# Patient Record
Sex: FEMALE | Race: WHITE | Employment: FULL TIME
[De-identification: names, ages, dates, MRNs, and addresses within clinical notes are randomized per-mention and may not be internally consistent; named-entity substitution may affect disease eponyms.]

---

## 2017-11-12 ENCOUNTER — HEALTH MAINTENANCE LETTER (OUTPATIENT)
Age: 22
End: 2017-11-12

## 2023-02-28 ENCOUNTER — VIRTUAL VISIT (OUTPATIENT)
Dept: FAMILY MEDICINE | Facility: CLINIC | Age: 28
End: 2023-02-28

## 2023-02-28 DIAGNOSIS — Z34.00 SUPERVISION OF NORMAL FIRST PREGNANCY: Primary | ICD-10-CM

## 2023-02-28 PROCEDURE — 99207 PR NO CHARGE NURSE ONLY: CPT

## 2023-02-28 RX ORDER — PRENATAL VIT/IRON FUM/FOLIC AC 27MG-0.8MG
1 TABLET ORAL DAILY
COMMUNITY

## 2023-03-19 LAB
ABO/RH(D): NORMAL
ANTIBODY SCREEN: NEGATIVE
SPECIMEN EXPIRATION DATE: NORMAL

## 2023-03-20 ENCOUNTER — LAB (OUTPATIENT)
Dept: LAB | Facility: CLINIC | Age: 28
End: 2023-03-20
Payer: COMMERCIAL

## 2023-03-20 ENCOUNTER — HOSPITAL ENCOUNTER (OUTPATIENT)
Dept: ULTRASOUND IMAGING | Facility: CLINIC | Age: 28
Discharge: HOME | End: 2023-03-20
Attending: NURSE PRACTITIONER | Admitting: NURSE PRACTITIONER
Payer: COMMERCIAL

## 2023-03-20 DIAGNOSIS — Z34.00 SUPERVISION OF NORMAL FIRST PREGNANCY: ICD-10-CM

## 2023-03-20 LAB
ERYTHROCYTE [DISTWIDTH] IN BLOOD BY AUTOMATED COUNT: 12.9 % (ref 10–15)
HCT VFR BLD AUTO: 41 % (ref 35–47)
HGB BLD-MCNC: 13.7 G/DL (ref 11.7–15.7)
MCH RBC QN AUTO: 31.9 PG (ref 26.5–33)
MCHC RBC AUTO-ENTMCNC: 33.4 G/DL (ref 31.5–36.5)
MCV RBC AUTO: 95 FL (ref 78–100)
PLATELET # BLD AUTO: 203 10E3/UL (ref 150–450)
RBC # BLD AUTO: 4.3 10E6/UL (ref 3.8–5.2)
WBC # BLD AUTO: 7.5 10E3/UL (ref 4–11)

## 2023-03-20 PROCEDURE — 85027 COMPLETE CBC AUTOMATED: CPT

## 2023-03-20 PROCEDURE — 36415 COLL VENOUS BLD VENIPUNCTURE: CPT

## 2023-03-20 PROCEDURE — 76801 OB US < 14 WKS SINGLE FETUS: CPT

## 2023-03-20 PROCEDURE — 87340 HEPATITIS B SURFACE AG IA: CPT

## 2023-03-20 PROCEDURE — 87389 HIV-1 AG W/HIV-1&-2 AB AG IA: CPT

## 2023-03-20 PROCEDURE — 86803 HEPATITIS C AB TEST: CPT

## 2023-03-20 PROCEDURE — 86901 BLOOD TYPING SEROLOGIC RH(D): CPT

## 2023-03-20 PROCEDURE — 86762 RUBELLA ANTIBODY: CPT

## 2023-03-20 PROCEDURE — 86780 TREPONEMA PALLIDUM: CPT

## 2023-03-20 PROCEDURE — 86900 BLOOD TYPING SEROLOGIC ABO: CPT

## 2023-03-20 PROCEDURE — 87086 URINE CULTURE/COLONY COUNT: CPT

## 2023-03-20 PROCEDURE — 86850 RBC ANTIBODY SCREEN: CPT

## 2023-03-21 LAB
HBV SURFACE AG SERPL QL IA: NONREACTIVE
HCV AB SERPL QL IA: NONREACTIVE
HIV 1+2 AB+HIV1 P24 AG SERPL QL IA: NONREACTIVE
RUBV IGG SERPL QL IA: 5.46 INDEX
RUBV IGG SERPL QL IA: POSITIVE
T PALLIDUM AB SER QL: NONREACTIVE

## 2023-03-22 LAB — BACTERIA UR CULT: NO GROWTH

## 2023-04-10 ENCOUNTER — PRENATAL OFFICE VISIT (OUTPATIENT)
Dept: FAMILY MEDICINE | Facility: CLINIC | Age: 28
End: 2023-04-10
Payer: COMMERCIAL

## 2023-04-10 ENCOUNTER — MYC MEDICAL ADVICE (OUTPATIENT)
Dept: FAMILY MEDICINE | Facility: CLINIC | Age: 28
End: 2023-04-10

## 2023-04-10 VITALS
HEART RATE: 91 BPM | HEIGHT: 64 IN | DIASTOLIC BLOOD PRESSURE: 60 MMHG | OXYGEN SATURATION: 99 % | TEMPERATURE: 98.1 F | BODY MASS INDEX: 33.29 KG/M2 | SYSTOLIC BLOOD PRESSURE: 120 MMHG | WEIGHT: 195 LBS | RESPIRATION RATE: 18 BRPM

## 2023-04-10 DIAGNOSIS — Z34.02 ENCOUNTER FOR PRENATAL CARE OF FIRST PREGNANCY, SECOND TRIMESTER: Primary | ICD-10-CM

## 2023-04-10 PROCEDURE — 87491 CHLMYD TRACH DNA AMP PROBE: CPT | Performed by: NURSE PRACTITIONER

## 2023-04-10 PROCEDURE — G0145 SCR C/V CYTO,THINLAYER,RESCR: HCPCS | Performed by: NURSE PRACTITIONER

## 2023-04-10 PROCEDURE — 87591 N.GONORRHOEAE DNA AMP PROB: CPT | Performed by: NURSE PRACTITIONER

## 2023-04-10 PROCEDURE — 99203 OFFICE O/P NEW LOW 30 MIN: CPT | Performed by: NURSE PRACTITIONER

## 2023-04-10 ASSESSMENT — PAIN SCALES - GENERAL: PAINLEVEL: NO PAIN (0)

## 2023-04-10 ASSESSMENT — PATIENT HEALTH QUESTIONNAIRE - PHQ9
10. IF YOU CHECKED OFF ANY PROBLEMS, HOW DIFFICULT HAVE THESE PROBLEMS MADE IT FOR YOU TO DO YOUR WORK, TAKE CARE OF THINGS AT HOME, OR GET ALONG WITH OTHER PEOPLE: NOT DIFFICULT AT ALL
SUM OF ALL RESPONSES TO PHQ QUESTIONS 1-9: 8
SUM OF ALL RESPONSES TO PHQ QUESTIONS 1-9: 8

## 2023-04-10 NOTE — PROGRESS NOTES
Pregnancy risks              1. Pregravid BMI 31- discussed recommended weight gain- monitor    Prenatal care plan              - OB labs:  Blood type O pos,  immune  Rubella, all others are normal              - First trimester screening:  Discussed declined              - Second trimester screening:  Discussed declined              - Pain management:  Will discuss at the future visit              - Ped: will discuss at future visit              - Circumcision if boy: Will discuss at the future visit              - BC: Will discuss at the future visit              - Breast feeding:  Will discuss in the future visits              - Covid 19 vaccine: declined              - flu: declined                 SUBJECTIVE:     HPI:    This is a 27 year old female patient,  who presents for her first obstetrical visit.    JAMI: 10/21/2023, by Ultrasound.  She is 12w2d weeks.  Her cycles are irregular.  Her last menstrual period was normal.   Since her LMP, she has experienced  nausea, emesis and fatigue).   She denies abdominal pain, vaginal discharge and vaginal bleeding.    Additional History:         Have you travelled during the pregnancy?No  Have your sexual partner(s) travelled during the pregnancy?No      HISTORY:   Planned Pregnancy: No  Marital Status: Single  Occupation: Nail tech  Living in Household: significant other    Past History:  Her past medical history   Past Medical History:   Diagnosis Date     NO ACTIVE PROBLEMS     Td due    .      She has a history of  no medical history changes    Since her last LMP she denies use of alcohol, tobacco and street drugs.    Past medical, surgical, social and family history were reviewed and updated in [facility].        Current Outpatient Medications   Medication     Prenatal Vit-Fe Fumarate-FA (PRENATAL MULTIVITAMIN W/IRON) 27-0.8 MG tablet     cetirizine (ZYRTEC) 10 MG tablet     Norgestim-Eth Estrad Triphasic (TRI-SPRINTEC) 0.18/0.215/0.25 MG-35 MCG TABS     No  "current facility-administered medications for this visit.       ROS:   12 point review of systems negative other than symptoms noted below or in the HPI.      OBJECTIVE:     EXAM:  /60   Pulse 91   Temp 98.1  F (36.7  C) (Temporal)   Resp 18   Ht 1.626 m (5' 4\")   Wt 88.5 kg (195 lb)   LMP 12/23/2022 (Approximate)   SpO2 99%   BMI 33.47 kg/m   Body mass index is 33.47 kg/m .    GENERAL: healthy, alert and no distress  EYES: Eyes grossly normal to inspection, PERRL and conjunctivae and sclerae normal  HENT: ear canals and TM's normal, nose and mouth without ulcers or lesions  NECK: no adenopathy, no asymmetry, masses, or scars and thyroid normal to palpation  RESP: lungs clear to auscultation - no rales, rhonchi or wheezes  BREAST: normal without masses, tenderness or nipple discharge and no palpable axillary masses or adenopathy  CV: regular rate and rhythm, normal S1 S2, no S3 or S4, no murmur, click or rub, no peripheral edema and peripheral pulses strong  ABDOMEN: soft, nontender, no hepatosplenomegaly, no masses and bowel sounds normal   (female): normal female external genitalia, normal urethral meatus, vaginal mucosa pink, moist, well rugated, and normal cervix/adnexa/uterus without masses or discharge  MS: no gross musculoskeletal defects noted, no edema  SKIN: no suspicious lesions or rashes  NEURO: Normal strength and tone, mentation intact and speech normal  PSYCH: mentation appears normal, affect normal/bright    ASSESSMENT/PLAN:       ICD-10-CM    1. Encounter for prenatal care of first pregnancy, second trimester  Z34.02 Pap screen reflex to HPV if ASCUS - recommend age 25 - 29     NEISSERIA GONORRHOEA PCR     CHLAMYDIA TRACHOMATIS PCR     US OB > 14 Weeks                                     Routine Prenatal Care:  -Discussed genetic screening options, including sequential and quad testing/AFP, cell-free DNA as well as diagnostic testing including amniocentesis. Patient would like to " think about further, will discuss next visit  -Discussed Cystic Fibrosis and SMA carrier screening, patient would like to think about further, will discuss next visit  -Discussed ordered labs and ultrasound,   all questions answered.      -Folder given, outlining exercise, weight gain, schedule of visits, routine and indicated ultrasounds, prenatal vitamins and childbirth education.  Desires delivery at [facility]          Return in 4 weeks for routine OB care                   Caroline Grijalva, NP

## 2023-04-11 LAB
C TRACH DNA SPEC QL NAA+PROBE: NEGATIVE
N GONORRHOEA DNA SPEC QL NAA+PROBE: NEGATIVE

## 2023-04-13 ENCOUNTER — PATIENT OUTREACH (OUTPATIENT)
Dept: FAMILY MEDICINE | Facility: CLINIC | Age: 28
End: 2023-04-13
Payer: COMMERCIAL

## 2023-04-13 DIAGNOSIS — R87.610 ASCUS OF CERVIX WITH NEGATIVE HIGH RISK HPV: ICD-10-CM

## 2023-04-13 LAB
BKR LAB AP GYN ADEQUACY: NORMAL
BKR LAB AP GYN INTERPRETATION: NORMAL
BKR LAB AP HPV REFLEX: NORMAL
BKR LAB AP PREVIOUS ABNORMAL: NORMAL
PATH REPORT.COMMENTS IMP SPEC: NORMAL
PATH REPORT.COMMENTS IMP SPEC: NORMAL
PATH REPORT.RELEVANT HX SPEC: NORMAL

## 2023-04-29 ENCOUNTER — HEALTH MAINTENANCE LETTER (OUTPATIENT)
Age: 28
End: 2023-04-29

## 2023-05-08 ENCOUNTER — PRENATAL OFFICE VISIT (OUTPATIENT)
Dept: FAMILY MEDICINE | Facility: CLINIC | Age: 28
End: 2023-05-08
Payer: COMMERCIAL

## 2023-05-08 VITALS
HEART RATE: 86 BPM | SYSTOLIC BLOOD PRESSURE: 110 MMHG | WEIGHT: 204 LBS | RESPIRATION RATE: 16 BRPM | DIASTOLIC BLOOD PRESSURE: 64 MMHG | BODY MASS INDEX: 35.02 KG/M2 | TEMPERATURE: 98.6 F | OXYGEN SATURATION: 100 %

## 2023-05-08 DIAGNOSIS — Z34.02 ENCOUNTER FOR SUPERVISION OF LOW-RISK FIRST PREGNANCY IN SECOND TRIMESTER: ICD-10-CM

## 2023-05-08 PROBLEM — Z34.00 SUPERVISION OF LOW-RISK FIRST PREGNANCY: Status: ACTIVE | Noted: 2023-05-08

## 2023-05-08 PROCEDURE — 99207 PR PRENATAL VISIT: CPT | Performed by: FAMILY MEDICINE

## 2023-05-08 ASSESSMENT — PAIN SCALES - GENERAL: PAINLEVEL: NO PAIN (0)

## 2023-05-08 NOTE — PROGRESS NOTES
Concerns: Doing well. No nausea or vomiting. Has been slightly fatigued but feels well overall.   No concerns. Has not felt movement yet.   Discussed anenatal screening.  She declines testing at this time.  Reportable signs and symptoms discussed.  Will schedule anatomy ultrasound.  RTC 4 weeks.    Bertha Parra, MS4  [facility] [employer]      I was present with the student who participated in the service and in the documentation of the note. I have verified the history and personally performed the physical exam and medical decision-making. I agree with the assessment and plan of care as documented in the note.    Electronically signed by:  Christiano Conteh M.D.  May 8, 2023

## 2023-05-23 ENCOUNTER — MYC MEDICAL ADVICE (OUTPATIENT)
Dept: FAMILY MEDICINE | Facility: CLINIC | Age: 28
End: 2023-05-23
Payer: MEDICAID

## 2023-06-05 ENCOUNTER — PRENATAL OFFICE VISIT (OUTPATIENT)
Dept: FAMILY MEDICINE | Facility: CLINIC | Age: 28
End: 2023-06-05
Payer: COMMERCIAL

## 2023-06-05 VITALS
OXYGEN SATURATION: 99 % | HEIGHT: 63 IN | RESPIRATION RATE: 18 BRPM | HEART RATE: 78 BPM | WEIGHT: 206 LBS | DIASTOLIC BLOOD PRESSURE: 62 MMHG | SYSTOLIC BLOOD PRESSURE: 100 MMHG | TEMPERATURE: 98.6 F | BODY MASS INDEX: 36.5 KG/M2

## 2023-06-05 DIAGNOSIS — Z34.02 ENCOUNTER FOR SUPERVISION OF LOW-RISK FIRST PREGNANCY IN SECOND TRIMESTER: Primary | ICD-10-CM

## 2023-06-05 PROCEDURE — 99207 PR PRENATAL VISIT: CPT | Performed by: NURSE PRACTITIONER

## 2023-06-05 ASSESSMENT — PAIN SCALES - GENERAL: PAINLEVEL: NO PAIN (0)

## 2023-06-05 NOTE — PROGRESS NOTES
Pregnancy risks                     None     Prenatal care plan              - OB labs:  Blood type O pos,  immune  Rubella, all others are normal              - First trimester screening:  Discussed declined              - Second trimester screening:  Discussed declined              - Pain management: epidural              - Ped: will discuss at future visit              - Circumcision if boy: Will discuss at the future visit              - BC: Will discuss at the future visit              - Breast feeding: yes              - Covid 19 vaccine: declined              - flu: declined      Concerns today:   Doing well.  No concerns today.  No nausea/vomiting. No heartburn.  No vaginal bleeding, no contractions/severe cramping, no leakage of fluid.  No vaginal discharge. No dysuria  No headache, vision changes, lower extremity swelling, upper abdominal pain, chest pain, shortness of breath.   Discussed PTL, PROM, and when to call or come in.  Discussed kick counts and fetal movement.  Reportable signs and symptoms discussed.      Caroline Grijalva, NP

## 2023-06-19 ENCOUNTER — HOSPITAL ENCOUNTER (OUTPATIENT)
Dept: ULTRASOUND IMAGING | Facility: CLINIC | Age: 28
Discharge: HOME | End: 2023-06-19
Attending: NURSE PRACTITIONER | Admitting: NURSE PRACTITIONER
Payer: COMMERCIAL

## 2023-06-19 DIAGNOSIS — Z34.02 ENCOUNTER FOR PRENATAL CARE OF FIRST PREGNANCY, SECOND TRIMESTER: ICD-10-CM

## 2023-06-19 PROCEDURE — 76805 OB US >/= 14 WKS SNGL FETUS: CPT

## 2023-07-13 ENCOUNTER — PRENATAL OFFICE VISIT (OUTPATIENT)
Dept: FAMILY MEDICINE | Facility: CLINIC | Age: 28
End: 2023-07-13
Payer: COMMERCIAL

## 2023-07-13 VITALS
HEIGHT: 63 IN | SYSTOLIC BLOOD PRESSURE: 102 MMHG | BODY MASS INDEX: 38.09 KG/M2 | HEART RATE: 84 BPM | TEMPERATURE: 97.6 F | WEIGHT: 215 LBS | RESPIRATION RATE: 18 BRPM | DIASTOLIC BLOOD PRESSURE: 62 MMHG | OXYGEN SATURATION: 99 %

## 2023-07-13 DIAGNOSIS — Z34.02 ENCOUNTER FOR SUPERVISION OF LOW-RISK FIRST PREGNANCY IN SECOND TRIMESTER: Primary | ICD-10-CM

## 2023-07-13 PROCEDURE — 99207 PR PRENATAL VISIT: CPT | Performed by: NURSE PRACTITIONER

## 2023-07-13 ASSESSMENT — PAIN SCALES - GENERAL: PAINLEVEL: NO PAIN (0)

## 2023-07-13 NOTE — PROGRESS NOTES
Pregnancy risks                     None     Prenatal care plan              - OB labs:  Blood type O pos,  immune  Rubella, all others are normal              - First trimester screening:  Discussed declined              - Second trimester screening:  Discussed declined              - Pain management: epidural              - Ped: White/ Wero              - BC: condoms to start              - Breast feeding: yes              - Covid 19 vaccine: declined    Concerns:   Doing well.  No concerns today.  No vaginal bleeding, no contractions, no leakage of fluid  No nausea/vomiting. No heartburn  No vaginal discharge. No dysuria.   No headache, vision changes, lower extremity swelling, upper abdominal pain, chest pain, shortness of breath  Discussed kick counts and fetal movement.  Reportable signs and symptoms discussed.  Tdap planned next visit  Discussed PTL, PROM, and when to call or come in.  Normal anatomy ultrasound.  RTC 4 weeks.  GTT and labs next visit       Caroline Grijalva NP

## 2023-07-24 ENCOUNTER — MYC MEDICAL ADVICE (OUTPATIENT)
Dept: FAMILY MEDICINE | Facility: CLINIC | Age: 28
End: 2023-07-24
Payer: MEDICAID

## 2023-07-31 ENCOUNTER — PRENATAL OFFICE VISIT (OUTPATIENT)
Dept: FAMILY MEDICINE | Facility: CLINIC | Age: 28
End: 2023-07-31
Payer: COMMERCIAL

## 2023-07-31 VITALS
SYSTOLIC BLOOD PRESSURE: 100 MMHG | BODY MASS INDEX: 38.62 KG/M2 | TEMPERATURE: 98.2 F | OXYGEN SATURATION: 97 % | WEIGHT: 218 LBS | HEART RATE: 74 BPM | DIASTOLIC BLOOD PRESSURE: 62 MMHG

## 2023-07-31 DIAGNOSIS — Z34.02 ENCOUNTER FOR SUPERVISION OF LOW-RISK FIRST PREGNANCY IN SECOND TRIMESTER: ICD-10-CM

## 2023-07-31 LAB
GLUCOSE 1H P 50 G GLC PO SERPL-MCNC: 179 MG/DL (ref 70–129)
HGB BLD-MCNC: 12.4 G/DL (ref 11.7–15.7)

## 2023-07-31 PROCEDURE — 86780 TREPONEMA PALLIDUM: CPT | Performed by: NURSE PRACTITIONER

## 2023-07-31 PROCEDURE — 90471 IMMUNIZATION ADMIN: CPT | Performed by: NURSE PRACTITIONER

## 2023-07-31 PROCEDURE — 90715 TDAP VACCINE 7 YRS/> IM: CPT | Performed by: NURSE PRACTITIONER

## 2023-07-31 PROCEDURE — 36415 COLL VENOUS BLD VENIPUNCTURE: CPT | Performed by: NURSE PRACTITIONER

## 2023-07-31 PROCEDURE — 99207 PR PRENATAL VISIT: CPT | Performed by: NURSE PRACTITIONER

## 2023-07-31 PROCEDURE — 82950 GLUCOSE TEST: CPT | Performed by: NURSE PRACTITIONER

## 2023-07-31 NOTE — PROGRESS NOTES
Pregnancy risks                     None     Prenatal care plan              - OB labs:  Blood type O pos,  immune  Rubella, all others are normal              - First trimester screening:  Discussed declined              - Second trimester screening:  Discussed declined              - Pain management: epidural              - Ped: White/ Wero              - BC: condoms to start              - Breast feeding: yes              - Covid 19 vaccine: declined              - Tdap 7/31/23      Concerns:   Doing well.  No concerns today.  No vaginal bleeding, loss of fluid, or contractions  1 hour GTT done today.  Discussed kick counts and fetal movement.  Reportable signs and symptoms discussed.  Tdap given today  Discussed kick counts and fetal movement.  Discussed PTL, PROM, and when to call or come in.  RTC in 2 weeks.      Caroline Grijalva, NP

## 2023-07-31 NOTE — NURSING NOTE
Prior to injection, verified patient identity using patient's name and date of birth.  Due to injection administration, patient instructed to remain in clinic for 15 minutes  afterwards, and to report any adverse reaction to me immediately.    Screening Questionnaire for Adult Immunization     Are you sick today?   No    Do you have allergies to medications, food or any vaccine?   No    Have you ever had a serious reaction after receiving a vaccination?   No    Do you have a long-term health problem with heart disease, lung disease,  asthma, kidney disease, diabetes, anemia, metabolic or blood disease?   No    Do you have cancer, leukemia, AIDS, or any immune system problem?   No    Do you take cortisone, prednisone, other steroids, or anticancer drugs, or  have you had any x-ray (radiation) treatments?   No    Have you had a seizure, brain, or other nervous system problem?   No    During the past year, have you received a transfusion of blood or blood       products, or been given a medicine called immune (gamma) globulin?   No    For women: Are you pregnant or is there a chance you could become         pregnant during the next month?   No    Have you received any vaccinations in the past 4 weeks?   No     Immunization questionnaire answers were all negative.      MNVFC doesn't apply on this patient    Per orders of Caroline Grijalva , injection of Adacel was  given by Genesis Parker MA. Patient instructed to remain in clinic for 20 minutes afterwards, and to report any adverse reaction to me immediately.       Screening performed by Genesis Parker MA on 7/31/2023 at 11:58 AM.

## 2023-08-01 LAB — T PALLIDUM AB SER QL: NONREACTIVE

## 2023-08-07 ENCOUNTER — LAB (OUTPATIENT)
Dept: LAB | Facility: CLINIC | Age: 28
End: 2023-08-07
Payer: COMMERCIAL

## 2023-08-07 DIAGNOSIS — Z34.02 ENCOUNTER FOR SUPERVISION OF LOW-RISK FIRST PREGNANCY IN SECOND TRIMESTER: ICD-10-CM

## 2023-08-07 DIAGNOSIS — O09.93 SUPERVISION OF HIGH RISK PREGNANCY IN THIRD TRIMESTER: ICD-10-CM

## 2023-08-07 DIAGNOSIS — O24.410 GDM (GESTATIONAL DIABETES MELLITUS), CLASS A1: Primary | ICD-10-CM

## 2023-08-07 LAB
GESTATIONAL GTT 1 HR POST DOSE: 166 MG/DL (ref 60–179)
GESTATIONAL GTT 2 HR POST DOSE: 163 MG/DL (ref 60–154)
GESTATIONAL GTT 3 HR POST DOSE: 127 MG/DL (ref 60–139)
GLUCOSE P FAST SERPL-MCNC: 97 MG/DL (ref 60–94)

## 2023-08-07 PROCEDURE — 82952 GTT-ADDED SAMPLES: CPT

## 2023-08-07 PROCEDURE — 82951 GLUCOSE TOLERANCE TEST (GTT): CPT

## 2023-08-07 PROCEDURE — 36415 COLL VENOUS BLD VENIPUNCTURE: CPT

## 2023-08-08 ENCOUNTER — HOSPITAL ENCOUNTER (OUTPATIENT)
Dept: ULTRASOUND IMAGING | Facility: CLINIC | Age: 28
Discharge: HOME | End: 2023-08-08
Attending: FAMILY MEDICINE | Admitting: FAMILY MEDICINE
Payer: MEDICAID

## 2023-08-08 DIAGNOSIS — Z34.02 ENCOUNTER FOR SUPERVISION OF LOW-RISK FIRST PREGNANCY IN SECOND TRIMESTER: ICD-10-CM

## 2023-08-08 PROCEDURE — 76816 OB US FOLLOW-UP PER FETUS: CPT

## 2023-08-10 RX ORDER — LANCETS
EACH MISCELLANEOUS
Qty: 100 EACH | Refills: 6 | Status: SHIPPED | OUTPATIENT
Start: 2023-08-10 | End: 2024-03-22

## 2023-08-14 ENCOUNTER — VIRTUAL VISIT (OUTPATIENT)
Dept: EDUCATION SERVICES | Facility: CLINIC | Age: 28
End: 2023-08-14
Attending: NURSE PRACTITIONER
Payer: COMMERCIAL

## 2023-08-14 DIAGNOSIS — O09.93 SUPERVISION OF HIGH RISK PREGNANCY IN THIRD TRIMESTER: ICD-10-CM

## 2023-08-14 DIAGNOSIS — O24.410 GDM (GESTATIONAL DIABETES MELLITUS), CLASS A1: Primary | ICD-10-CM

## 2023-08-14 PROCEDURE — G0109 DIAB MANAGE TRN IND/GROUP: HCPCS | Mod: 95

## 2023-08-14 NOTE — GROUP NOTE
Gestational Diabetes Self-Management Education & Support  8/14/2023  [facility] [place]  Start and End Time  Start Time: 1300  End Time: 1400    Virtual Group class via medical zoom    SUBJECTIVE / OBJECTIVE  Cultural Influences/Ethnic Background:  Choose not to answer    Estimated Date of Delivery: Oct 21, 2023    1 hour OGTT  Lab Results   Component Value Date    GLU1 179 (H) 07/31/2023       3 hour OGTT    Fasting  No results found for: GLF    1 hour  No results found for: GL1    2 hour  No results found for: GL2    3 hour  No results found for: GL3    Gestational Diabetes Self-Management Education & Support    Educational topics covered today:  GDM diagnosis, pathophysiology, Risks and Complications of GDM, Means of controlling GDM, Using a Blood Glucose Monitor, Blood Glucose Goals, Logging and Interpreting Glucose Results, Ketone Testing, When to Call a Diabetes Educator or OB Provider, Healthy Eating During Pregnancy, Counting Carbohydrates, Meal Planning for GDM, and Physical Activity    Educational materials provided today:   [facility] Gestational Diabetes  GDM Log Book  Sharps Disposal  Care After Delivery  Pt verbalized understanding of concepts discussed and recommendations provided today.     PLAN:  Check glucose 4 times daily, before breakfast and 1 hour after each meal.     Check Ketones daily for one week, if negative, reduce testing to once a week.     Follow consistent CHO meal plan, eat CHO and protein/fat at all meals/snacks.    Call/e-mail/MyChart message diabetes educator if 3 or more blood sugars are above the goal in 1 week or if ketones are positive or with questions/concerns.          Diabetes Educator:  Tatiana Chairez RD

## 2023-08-14 NOTE — LETTER
8/14/2023         RE: Chely Dougherty  [address] [place] [address]        Dear Colleague,    Thank you for referring your patient, Chely Doughrety, to the [facility] [place]. Please see a copy of my visit note below.    No notes on file     dyspnea on exertion

## 2023-08-15 ENCOUNTER — PRENATAL OFFICE VISIT (OUTPATIENT)
Dept: FAMILY MEDICINE | Facility: CLINIC | Age: 28
End: 2023-08-15
Payer: COMMERCIAL

## 2023-08-15 VITALS
BODY MASS INDEX: 39.34 KG/M2 | OXYGEN SATURATION: 97 % | HEIGHT: 63 IN | SYSTOLIC BLOOD PRESSURE: 112 MMHG | DIASTOLIC BLOOD PRESSURE: 68 MMHG | HEART RATE: 105 BPM | TEMPERATURE: 97.7 F | WEIGHT: 222 LBS

## 2023-08-15 DIAGNOSIS — O09.93 SUPERVISION OF HIGH RISK PREGNANCY IN THIRD TRIMESTER: Primary | ICD-10-CM

## 2023-08-15 DIAGNOSIS — O24.410 GDM, CLASS A1: ICD-10-CM

## 2023-08-15 PROBLEM — O09.90 PREGNANCY, SUPERVISION, HIGH-RISK: Status: ACTIVE | Noted: 2023-08-15

## 2023-08-15 PROBLEM — Z34.00 SUPERVISION OF LOW-RISK FIRST PREGNANCY: Status: RESOLVED | Noted: 2023-05-08 | Resolved: 2023-08-15

## 2023-08-15 PROCEDURE — 99207 PR COMPLICATED OB VISIT: CPT | Performed by: FAMILY MEDICINE

## 2023-08-15 ASSESSMENT — PAIN SCALES - GENERAL: PAINLEVEL: NO PAIN (0)

## 2023-08-15 NOTE — PROGRESS NOTES
Concerns: no concerns at this time  No vaginal bleeding, LOF.  No contractions.  Just started checking sugars, fasting today was 88, 1 hour after breakfast it was 150.  We discussed goal sugars as fasting <95 and 1 hour pp <140, 2 hour pp ,130.  She has a follow up with the RD next week and will review her sugars then.    Discussed kick counts and fetal movement.  Discussed PTL, PROM, and when to call or come in.  RTC 2 weeks with Caroline Grijalva, then me in a month.    Electronically signed by:  Christiano Conteh M.D.  8/15/2023

## 2023-08-23 ENCOUNTER — VIRTUAL VISIT (OUTPATIENT)
Dept: EDUCATION SERVICES | Facility: CLINIC | Age: 28
End: 2023-08-23
Payer: COMMERCIAL

## 2023-08-23 DIAGNOSIS — O24.410 GDM (GESTATIONAL DIABETES MELLITUS), CLASS A1: Primary | ICD-10-CM

## 2023-08-23 PROCEDURE — 98968 PH1 ASSMT&MGMT NQHP 21-30: CPT | Mod: 95 | Performed by: NUTRITIONIST

## 2023-08-23 NOTE — LETTER
8/23/2023         RE: Chely Dougherty  [address]  [place] [address]        Dear Colleague,    Thank you for referring your patient, Chely Dougherty, to the [facility]. Please see a copy of my visit note below.    Diabetes Self-Management Education & Support  Type of service:  Video Visit    If the video visit is dropped, the video visit invitation should be resent by: Text to cell phone: [phone]    Originating Location (pt. Location): Home  Distant Location (provider location): [facility]  Mode of Communication:  Video Conference via Stock[employer]    Video Start Time:  2:01 PM  Video End Time (time video stopped): 2:27 PM    How would patient like to obtain AVS? MyChart    SUBJECTIVE/OBJECTIVE:  Presents for education related to gestational diabetes.    Accompanied by: Self  Gestational weeks: 31w 3d  Hospital planned [facility] delivery: [facility]  Next OB Visit Date: 08/28/23  Pre pregnancy Wt: 195 lbs.  Number of previous pregnancies: 0  Had any babies over 9 lbs: No  Previously had Gestational Diabetes: No  Have you ever had thyroid problems or taken thyroid medication?: No  Heart disease, mitral valve prolapse or rheumatic fever?: No  Hypertension : No  High Cholesterol: No  High Triglycerides: No  Do you use tobacco products?: No  Do you drink beer, wine or hard liquor?: No    Cultural Influences/Ethnic Background:  Choose not to answer    LMP 12/23/2022 (Approximate)     Weight gain 27 lbs at 30 weeks gestation.    Estimated Date of Delivery: Oct 21, 2023    Blood Glucose/Ketone Log:   Date Ketones Fasting Post Breakfast Post Lunch Post Supper   8/15 neg 88 154 104 109   8/16 neg 92 159 109 118   8/17 neg 90 114 90 119   8/18 neg 88 158 85 137   8/19 neg 97 142 120 108   8/20 neg 92 126 144 121   8/21 neg 86 73 114 121   8/22 - 83 111 137 106   8/23 - 90 147         Lifestyle and Health Behaviors:  Exercise::  Currently not exercising  Barrier to exercise: None  Cultural/Sabianism diet restrictions?: No  Meal planning/habits: Plate planning method  How many times a week on average do you eat food made away from home (restaurant/take-out)?: 1  Breakfast: pb toast with a glass of milk or bowl of cereal (frosted flakes) 3/4 cup with milk  Lunch: turkey cheese wrap tortilla and peppers celery pb or apple  Dinner: chicken and green beans or hamburger helper (broccoli cheese noodles)  Snacks: bell peppers or apple/orange or cottage cheese  Beverages: Milk, Water, Juice (priti. occasionally and red solo cup of milk)  How many servings of fruits/vegetables per day: 4  Biggest challenges to healthy eating:  (consistency)  Pre- vitamin?: Yes (flintstone gummies)  Supplements?: No  Experiencing nausea?: No  Experiencing heartburn?: Yes (occasional)    Healthy Coping:  Emotional response to diabetes: Acceptance  Informal Support system:: Family, Friends, Significant other    Current Management:  Taking medications for gestational diabetes?: No  Difficulty affording diabetes testing supplies?: Yes    ASSESSMENT:  Ketones: neg.   Fasting blood glucoses: 88% in target.  After breakfast: 44% in target.  After lunch: 88% in target.  After dinner: 100% in target.    Chely reports above readings.  Highest are 1 hr pp breakfast, review of diet shows high carbohydrate intake especially with the consumption of milk or cereal at the meal.  She reports that she has cut back on eating out and decreasing the amount of junk food in diet.  She reports that she is active but will think about walking after meals.      INTERVENTION:  Educational topics covered today:  -If after breakfast blood sugar  Levels do not decrease she was reminded to reach out to the CDCES on her care team or diabetes triage.      What to expect after delivery, Future testing for Type 2 diabetes (2 hour OGTT at 6 week post-partum check-up and annual fasting blood glucose  level), Risk of GDM and planning ahead for future pregnancies, Recommended lifestyle interventions for reducing the risk of Type 2 Diabetes, When to Call a Diabetes Educator or OB Provider    Educational Materials provided today:  [facility] Preventing Diabetes    PLAN:  Check glucose 4 times daily.  Check ketones once a week when readings are consistently negative.  Continue with recommended physical activity.  Continue to follow recommended meal plan: 2-3 carbs at breakfast, 3-4 carbs at lunch, 3-4 carbs at supper, 1-2 carbs at snacks.  Follow consistent CHO meal plan, eat CHO and protein/fat at all meals/snacks.    Call/e-mail/Instagramhart message diabetes educator if 3 or more blood sugars are above the goal in 1 week or if ketones are positive.    Sulema Emerson, SHAKIR, LD, [facility]    Time Spent: 26  minutes  Encounter Type: Individual    Any diabetes medication dose changes were made via the CDE Protocol and Collaborative Practice Agreement with the patient's OB/GYN provider. A copy of this encounter was shared with the provider.

## 2023-08-23 NOTE — PATIENT INSTRUCTIONS
Chely,    It was great meeting with you today. Keep up your good work!    Please Send a my chart message with pictures of your blood sugar numbers next week.     Plan to share your glucose information with diabetes education once a week, unless otherwise directed.     1. Check glucose 4 times daily, before breakfast daily and 1 hour after each meal, as recommended.    2. Continue with recommended physical activity.    3. Continue to follow recommended meal plan: 30-45 carbs at breakfast, 45-60 carbs at lunch, 45-60 carbs at supper, 15-30 carbs at snacks.  Follow consistent CHO meal plan, eat CHO and protein/fat at all meals/snacks.    Here are some ideas for breakfast or bedtime snack. Pick a carbohydrate from the right and a protein from the left. If you have carbohydrates 30 grams of total carbohydrate and 3-5 grams of fiber that is even better.   Fruits are not listed as they can cause the blood sugar to raise blood sugar quickly and be used up early in the night. Fruits are better at meals, or morning or afternoon snack.     Protein/Fat list (about 14 grams of protein or 2 fat servings) Carbohydrate list (about 30 grams of carbohydrate)   2 slices of cheese English Muffin   2 TBS Peanut butter/[place] butter/Sun butter 10 crackers     cup Cottage cheese 2% 2 pieces of toast   2 oz any cooked meat - less than deck of cards size 1 hamburger or hot dog bun   1.5 oz of soy nuts 1 whole wheat dann   Avocado or guacamole 6 opal cracker squares     cup tuna - can add mayonnaise 1 cup full fat ice cream - no candy or sauce   2 eggs - boiled, poached, fried, scrambled, omelet 30 chips   1 veggie nancy (might have carbohydrates also) Greek yogurt - 30 grams of carbohydrate   2 TBS Coconut 2 - 6 inch tortillas corn or flour   12 shrimp - not breaded 2 toaster waffles - no syrup   2-1oz meatballs   bagel   2 Tbs cream cheese - plain, veggie, salmon - no fruit or honey. 6 cups popcorn - unsweetened     cup of nuts Granola  bar of 3o grams of carbohydrate   10 olives 1 cup of unsweetened lentils or beans.    Tofu or Temph 4-5oz 1 cup potato salad     You can always add vegetables with dip, salad dressing or salsa also.      4. Follow-up with OB doctor as recommended.    5. Call or MyChart message your diabetes educator if 3 or more blood sugars are above the goal in 1 week or if ketones are elevated (trace or above).       AFTER YOU DELIVER:    The first weeks after delivery:   Check blood sugar 4 - 6 times per week to be sure that the numbers are in target after baby is born. Check blood sugar before breakfast or 2 hours after the start of a meal.     Blood sugar goals are different when you are not pregnant:  Before breakfast: Less than 100  2 hours after a meal: Less than 140    If you have elevated numbers, contact your OB/GYN or primary care provider.    - Continue with healthy eating and physical activity to get back to your pre-pregnancy weight.   - Have a follow-up 2-hour Glucose Tolerance Test at your 6-week post-partum check-up.   - Have your fasting blood sugar checked once a year.  - Plan ahead for future pregnancies - eat healthy, keep active, work with your doctor to check for gestational diabetes early on in the pregnancy and check blood sugars as recommended by your doctor.      Sulema Emerson, SHAKIR, LD, [facility]   Diabetes Education Triage Line: [phone]  Diabetes Education Appointment [phone]

## 2023-08-23 NOTE — PROGRESS NOTES
Diabetes Self-Management Education & Support  Type of service:  Video Visit    If the video visit is dropped, the video visit invitation should be resent by: Text to cell phone: [phone]    Originating Location (pt. Location): Home  Distant Location (provider location): [facility]  Mode of Communication:  Video Conference via [employer]    Video Start Time:  2:01 PM  Video End Time (time video stopped): 2:27 PM    How would patient like to obtain AVS? MyChart    SUBJECTIVE/OBJECTIVE:  Presents for education related to gestational diabetes.    Accompanied by: Self  Gestational weeks: 31w 3d  Hospital planned for delivery: [facility]  Next OB Visit Date: 08/28/23  Pre pregnancy Wt: 195 lbs.  Number of previous pregnancies: 0  Had any babies over 9 lbs: No  Previously had Gestational Diabetes: No  Have you ever had thyroid problems or taken thyroid medication?: No  Heart disease, mitral valve prolapse or rheumatic fever?: No  Hypertension : No  High Cholesterol: No  High Triglycerides: No  Do you use tobacco products?: No  Do you drink beer, wine or hard liquor?: No    Cultural Influences/Ethnic Background:  Choose not to answer    LMP 12/23/2022 (Approximate)     Weight gain 27 lbs at 30 weeks gestation.    Estimated Date of Delivery: Oct 21, 2023    Blood Glucose/Ketone Log:   Date Ketones Fasting Post Breakfast Post Lunch Post Supper   8/15 neg 88 154 104 109   8/16 neg 92 159 109 118   8/17 neg 90 114 90 119   8/18 neg 88 158 85 137   8/19 neg 97 142 120 108   8/20 neg 92 126 144 121   8/21 neg 86 73 114 121   8/22 - 83 111 137 106   8/23 - 90 147         Lifestyle and Health Behaviors:  Exercise:: Currently not exercising  Barrier to exercise: None  Cultural/Yazidi diet restrictions?: No  Meal planning/habits: Plate planning method  How many times a week on average do you eat food made away from home (restaurant/take-out)?: 1  Breakfast: pb toast with a  glass of milk or bowl of cereal (frosted flakes) 3/4 cup with milk  Lunch: turkey cheese wrap tortilla and peppers celery pb or apple  Dinner: chicken and green beans or hamburger helper (broccoli cheese noodles)  Snacks: bell peppers or apple/orange or cottage cheese  Beverages: Milk, Water, Juice (priti. occasionally and red solo cup of milk)  How many servings of fruits/vegetables per day: 4  Biggest challenges to healthy eating:  (consistency)  Pre-van vitamin?: Yes (flintstone gummies)  Supplements?: No  Experiencing nausea?: No  Experiencing heartburn?: Yes (occasional)    Healthy Coping:  Emotional response to diabetes: Acceptance  Informal Support system:: Family, Friends, Significant other    Current Management:  Taking medications for gestational diabetes?: No  Difficulty affording diabetes testing supplies?: Yes    ASSESSMENT:  Ketones: neg.   Fasting blood glucoses: 88% in target.  After breakfast: 44% in target.  After lunch: 88% in target.  After dinner: 100% in target.    Chely reports above readings.  Highest are 1 hr pp breakfast, review of diet shows high carbohydrate intake especially with the consumption of milk or cereal at the meal.  She reports that she has cut back on eating out and decreasing the amount of junk food in diet.  She reports that she is active but will think about walking after meals.      INTERVENTION:  Educational topics covered today:  -If after breakfast blood sugar  Levels do not decrease she was reminded to reach out to the CDCES on her care team or diabetes triage.      What to expect after delivery, Future testing for Type 2 diabetes (2 hour OGTT at 6 week post-partum check-up and annual fasting blood glucose level), Risk of GDM and planning ahead for future pregnancies, Recommended lifestyle interventions for reducing the risk of Type 2 Diabetes, When to Call a Diabetes Educator or OB Provider    Educational Materials provided today:  [facility] Preventing  Diabetes    PLAN:  Check glucose 4 times daily.  Check ketones once a week when readings are consistently negative.  Continue with recommended physical activity.  Continue to follow recommended meal plan: 2-3 carbs at breakfast, 3-4 carbs at lunch, 3-4 carbs at supper, 1-2 carbs at snacks.  Follow consistent CHO meal plan, eat CHO and protein/fat at all meals/snacks.    Call/e-mail/MyChart message diabetes educator if 3 or more blood sugars are above the goal in 1 week or if ketones are positive.    Sulema Emerson RD, LD, [facility]    Time Spent: 26  minutes  Encounter Type: Individual    Any diabetes medication dose changes were made via the CDE Protocol and Collaborative Practice Agreement with the patient's OB/GYN provider. A copy of this encounter was shared with the provider.

## 2023-08-28 ENCOUNTER — PRENATAL OFFICE VISIT (OUTPATIENT)
Dept: FAMILY MEDICINE | Facility: CLINIC | Age: 28
End: 2023-08-28
Payer: COMMERCIAL

## 2023-08-28 VITALS
WEIGHT: 220.6 LBS | TEMPERATURE: 97.7 F | OXYGEN SATURATION: 100 % | HEART RATE: 68 BPM | BODY MASS INDEX: 39.08 KG/M2 | DIASTOLIC BLOOD PRESSURE: 64 MMHG | SYSTOLIC BLOOD PRESSURE: 116 MMHG

## 2023-08-28 DIAGNOSIS — O09.93 SUPERVISION OF HIGH RISK PREGNANCY IN THIRD TRIMESTER: Primary | ICD-10-CM

## 2023-08-28 DIAGNOSIS — O24.410 GDM, CLASS A1: ICD-10-CM

## 2023-08-28 PROCEDURE — 99207 PR PRENATAL VISIT: CPT | Performed by: NURSE PRACTITIONER

## 2023-08-28 ASSESSMENT — PAIN SCALES - GENERAL: PAINLEVEL: NO PAIN (0)

## 2023-08-28 NOTE — PROGRESS NOTES
Pregnancy risks                   1. Gestational Diabetes: diet controlled.  Growth us q 4 weeks and bpp at 40 week.  Consider delivery 39-40w6d     Prenatal care plan              - OB labs:  Blood type O pos,  immune  Rubella, all others are normal              - First trimester screening:  Discussed declined              - Second trimester screening:  Discussed declined              - Pain management: epidural              - Ped: White/ Wero              - BC: condoms to start              - Breast feeding: yes              - Covid 19 vaccine: declined              - Tdap 7/31/23              - discussed labor classes.      Concerns:   Doing well.  No concerns today.  No vaginal bleeding, LOF.  No contractions.  Cephalic position confirmed by Leopold maneuvers.  Discussed kick counts and fetal movement.  Reportable signs and symptoms discussed.  Discussed kick counts and fetal movement.  Discussed PTL, PROM, and when to call or come in.  RTC 2 weeks.    Sugars have been nicely controlled- fastings less than 95, occassional dinner above 140.  Did lose two pounds since changing eating habits.  Is up 40lb overall this pregnancy    Caroline Grijalva, NP

## 2023-09-08 ENCOUNTER — MYC MEDICAL ADVICE (OUTPATIENT)
Dept: EDUCATION SERVICES | Facility: CLINIC | Age: 28
End: 2023-09-08
Payer: MEDICAID

## 2023-09-11 ENCOUNTER — PRENATAL OFFICE VISIT (OUTPATIENT)
Dept: FAMILY MEDICINE | Facility: CLINIC | Age: 28
End: 2023-09-11
Payer: MEDICAID

## 2023-09-11 ENCOUNTER — HOSPITAL ENCOUNTER (OUTPATIENT)
Dept: ULTRASOUND IMAGING | Facility: CLINIC | Age: 28
Discharge: HOME | End: 2023-09-11
Attending: NURSE PRACTITIONER | Admitting: NURSE PRACTITIONER
Payer: MEDICAID

## 2023-09-11 VITALS
BODY MASS INDEX: 40.33 KG/M2 | OXYGEN SATURATION: 98 % | WEIGHT: 227.6 LBS | TEMPERATURE: 97.9 F | RESPIRATION RATE: 18 BRPM | DIASTOLIC BLOOD PRESSURE: 68 MMHG | SYSTOLIC BLOOD PRESSURE: 100 MMHG | HEIGHT: 63 IN | HEART RATE: 86 BPM

## 2023-09-11 DIAGNOSIS — O09.93 SUPERVISION OF HIGH RISK PREGNANCY IN THIRD TRIMESTER: Primary | ICD-10-CM

## 2023-09-11 DIAGNOSIS — O09.93 SUPERVISION OF HIGH RISK PREGNANCY IN THIRD TRIMESTER: ICD-10-CM

## 2023-09-11 DIAGNOSIS — O24.410 GDM, CLASS A1: ICD-10-CM

## 2023-09-11 DIAGNOSIS — O24.410 GDM (GESTATIONAL DIABETES MELLITUS), CLASS A1: ICD-10-CM

## 2023-09-11 PROCEDURE — 90686 IIV4 VACC NO PRSV 0.5 ML IM: CPT | Performed by: NURSE PRACTITIONER

## 2023-09-11 PROCEDURE — 76816 OB US FOLLOW-UP PER FETUS: CPT

## 2023-09-11 PROCEDURE — 90471 IMMUNIZATION ADMIN: CPT | Performed by: NURSE PRACTITIONER

## 2023-09-11 PROCEDURE — 99207 PR PRENATAL VISIT: CPT | Performed by: NURSE PRACTITIONER

## 2023-09-11 ASSESSMENT — PAIN SCALES - GENERAL: PAINLEVEL: NO PAIN (0)

## 2023-09-11 NOTE — PROGRESS NOTES
Pregnancy risks                   1. Gestational Diabetes: diet controlled.  Growth us q 4 weeks and bpp at 40 week.  Consider delivery 39-40w6d     Prenatal care plan              - OB labs:  Blood type O pos,  immune  Rubella, all others are normal              - First trimester screening:  Discussed declined              - Second trimester screening:  Discussed declined              - Pain management: epidural              - Ped: White/ Wero              - BC: condoms to start              - Breast feeding: yes              - Covid 19 vaccine: declined              - Flu: today              - Tdap 7/31/23              - discussed labor classes.                - discussed meds for baby 9/11/23               - toured [facility]  9/11/23       Concerns:   Doing well.  No concerns today.  No vaginal bleeding, LOF.  No contractions.  Cephalic position confirmed by Leopold maneuvers.  Discussed kick counts and fetal movement.  Reportable signs and symptoms discussed.  Discussed kick counts and fetal movement.  Discussed PTL, PROM, and when to call or come in.  RTC 2 weeks.    Blood sugars- am- 88-90, one hour after meals- checking each meal under 140- one-2 times elevated.  Followed by diabetes ed, monitor weight gain- 7lbs this past two weeks.  She is trying to have lower sugars/ carbs.  Adding more protein and exercise    Growth us today    Caroline Grijalva, ABHINAV

## 2023-09-12 NOTE — TELEPHONE ENCOUNTER
Gestational Diabetes Follow-up    Subjective/Objective:    Chelyeron Dougherty sent in blood glucose log for review. Last date of communication was: .    Gestational diabetes is being managed with diet and activity    Taking diabetes medications: no    Estimated Date of Delivery: Oct 21, 2023    BG/Food Log:   See MyChart    Assessment:    4 elevated readings, all after meals in the last few weeks. A few after breakfast and 1 after dinner.     Plan/Response:  No changes in the patient's current treatment plan.  Follow-up in 1 week.    SHAKIR [facility]  Triage: [phone]  [phone]      Any diabetes medication dose changes were made via the CDE Protocol and Collaborative Practice Agreement with the patient's referring provider. A copy of this encounter was shared with the provider.

## 2023-09-26 ENCOUNTER — PRENATAL OFFICE VISIT (OUTPATIENT)
Dept: FAMILY MEDICINE | Facility: CLINIC | Age: 28
End: 2023-09-26
Payer: MEDICAID

## 2023-09-26 VITALS
RESPIRATION RATE: 20 BRPM | TEMPERATURE: 97 F | WEIGHT: 225 LBS | HEART RATE: 90 BPM | HEIGHT: 65 IN | SYSTOLIC BLOOD PRESSURE: 114 MMHG | BODY MASS INDEX: 37.49 KG/M2 | OXYGEN SATURATION: 99 % | DIASTOLIC BLOOD PRESSURE: 72 MMHG

## 2023-09-26 DIAGNOSIS — O09.93 SUPERVISION OF HIGH RISK PREGNANCY IN THIRD TRIMESTER: Primary | ICD-10-CM

## 2023-09-26 DIAGNOSIS — O24.410 GDM, CLASS A1: ICD-10-CM

## 2023-09-26 LAB
HGB BLD-MCNC: 12.3 G/DL (ref 11.7–15.7)
T PALLIDUM AB SER QL: NONREACTIVE

## 2023-09-26 PROCEDURE — 99207 PR PRENATAL VISIT: CPT | Performed by: FAMILY MEDICINE

## 2023-09-26 PROCEDURE — 86780 TREPONEMA PALLIDUM: CPT | Performed by: FAMILY MEDICINE

## 2023-09-26 PROCEDURE — 59025 FETAL NON-STRESS TEST: CPT | Performed by: FAMILY MEDICINE

## 2023-09-26 PROCEDURE — 87653 STREP B DNA AMP PROBE: CPT | Performed by: FAMILY MEDICINE

## 2023-09-26 PROCEDURE — 36415 COLL VENOUS BLD VENIPUNCTURE: CPT | Performed by: FAMILY MEDICINE

## 2023-09-26 ASSESSMENT — PAIN SCALES - GENERAL: PAINLEVEL: NO PAIN (0)

## 2023-09-26 NOTE — PROGRESS NOTES
Concerns: none  .  No vaginal bleeding, LOF, contractions.  No HA, RUQ pain, N/V, visual changes.  Cervix is posterior/soft/40%/FT.  Cephalic position confirmed by Leopold maneuvers and cervical exam.  Discussed signs of labor and when to call or come in.  NST done today because of her GDM A1 and was reactive.  GBS and other labs done today.  Labor precautions discussed.  RTC 1 week.  Prenatal flowsheet information is reviewed.    Electronically signed by:  Christiano Conteh M.D.  9/26/2023

## 2023-09-27 LAB — GP B STREP DNA SPEC QL NAA+PROBE: NEGATIVE

## 2023-10-03 ENCOUNTER — PRENATAL OFFICE VISIT (OUTPATIENT)
Dept: FAMILY MEDICINE | Facility: CLINIC | Age: 28
End: 2023-10-03
Payer: MEDICAID

## 2023-10-03 VITALS
OXYGEN SATURATION: 99 % | WEIGHT: 227.4 LBS | DIASTOLIC BLOOD PRESSURE: 64 MMHG | BODY MASS INDEX: 37.89 KG/M2 | HEART RATE: 91 BPM | RESPIRATION RATE: 18 BRPM | TEMPERATURE: 97.8 F | SYSTOLIC BLOOD PRESSURE: 106 MMHG | HEIGHT: 65 IN

## 2023-10-03 DIAGNOSIS — O24.410 GDM, CLASS A1: ICD-10-CM

## 2023-10-03 DIAGNOSIS — O09.93 SUPERVISION OF HIGH RISK PREGNANCY IN THIRD TRIMESTER: Primary | ICD-10-CM

## 2023-10-03 PROCEDURE — 99207 PR COMPLICATED OB VISIT: CPT | Performed by: FAMILY MEDICINE

## 2023-10-03 ASSESSMENT — PAIN SCALES - GENERAL: PAINLEVEL: NO PAIN (0)

## 2023-10-03 NOTE — PROGRESS NOTES
Concerns: none, sugars have been well controlled.   No vaginal bleeding, LOF, contractions.  No HA, RUQ pain, N/V, visual changes.  Cervix check next visit.  Cephalic position confirmed by Leopold maneuvers.  Discussed signs of labor and when to call or come in.  Labor precautions discussed.  RTC 1 week.    Electronically signed by:  Christiano Conteh M.D.  10/3/2023

## 2023-10-09 ENCOUNTER — HOSPITAL ENCOUNTER (OUTPATIENT)
Dept: ULTRASOUND IMAGING | Facility: CLINIC | Age: 28
Discharge: HOME | End: 2023-10-09
Attending: FAMILY MEDICINE | Admitting: FAMILY MEDICINE
Payer: MEDICAID

## 2023-10-09 ENCOUNTER — PRENATAL OFFICE VISIT (OUTPATIENT)
Dept: FAMILY MEDICINE | Facility: CLINIC | Age: 28
End: 2023-10-09
Payer: COMMERCIAL

## 2023-10-09 VITALS
TEMPERATURE: 97.3 F | SYSTOLIC BLOOD PRESSURE: 118 MMHG | BODY MASS INDEX: 38.49 KG/M2 | HEART RATE: 104 BPM | HEIGHT: 65 IN | DIASTOLIC BLOOD PRESSURE: 74 MMHG | WEIGHT: 231 LBS | OXYGEN SATURATION: 99 % | RESPIRATION RATE: 20 BRPM

## 2023-10-09 DIAGNOSIS — O24.410 GDM, CLASS A1: ICD-10-CM

## 2023-10-09 DIAGNOSIS — O09.93 SUPERVISION OF HIGH RISK PREGNANCY IN THIRD TRIMESTER: ICD-10-CM

## 2023-10-09 DIAGNOSIS — O09.93 SUPERVISION OF HIGH RISK PREGNANCY IN THIRD TRIMESTER: Primary | ICD-10-CM

## 2023-10-09 PROCEDURE — 76819 FETAL BIOPHYS PROFIL W/O NST: CPT

## 2023-10-09 PROCEDURE — 99207 PR PRENATAL VISIT: CPT | Performed by: FAMILY MEDICINE

## 2023-10-09 ASSESSMENT — PAIN SCALES - GENERAL: PAINLEVEL: NO PAIN (0)

## 2023-10-09 NOTE — PROGRESS NOTES
Concerns: none  No vaginal bleeding, LOF, contractions.  No HA, RUQ pain, N/V, visual changes.  Cervix check next visit.  Cephalic position confirmed by Leopold maneuvers.  Discussed kick counts and fetal movement.  Reportable signs and symptoms discussed.  Labor precautions discussed.  RTC 1 week.    Electronically signed by:  Christiano Conteh M.D.  10/9/2023

## 2023-10-17 ENCOUNTER — HOSPITAL ENCOUNTER (OUTPATIENT)
Dept: ULTRASOUND IMAGING | Facility: CLINIC | Age: 28
Discharge: HOME | End: 2023-10-17
Attending: FAMILY MEDICINE | Admitting: FAMILY MEDICINE
Payer: MEDICAID

## 2023-10-17 ENCOUNTER — PRENATAL OFFICE VISIT (OUTPATIENT)
Dept: FAMILY MEDICINE | Facility: CLINIC | Age: 28
End: 2023-10-17
Payer: MEDICAID

## 2023-10-17 VITALS
DIASTOLIC BLOOD PRESSURE: 76 MMHG | HEART RATE: 76 BPM | SYSTOLIC BLOOD PRESSURE: 100 MMHG | RESPIRATION RATE: 16 BRPM | HEIGHT: 65 IN | OXYGEN SATURATION: 100 % | BODY MASS INDEX: 38.82 KG/M2 | TEMPERATURE: 98.3 F | WEIGHT: 233 LBS

## 2023-10-17 DIAGNOSIS — O24.410 GDM, CLASS A1: ICD-10-CM

## 2023-10-17 DIAGNOSIS — O09.93 SUPERVISION OF HIGH RISK PREGNANCY IN THIRD TRIMESTER: Primary | ICD-10-CM

## 2023-10-17 DIAGNOSIS — O09.93 SUPERVISION OF HIGH RISK PREGNANCY IN THIRD TRIMESTER: ICD-10-CM

## 2023-10-17 PROCEDURE — 99207 PR COMPLICATED OB VISIT: CPT | Performed by: FAMILY MEDICINE

## 2023-10-17 PROCEDURE — 76819 FETAL BIOPHYS PROFIL W/O NST: CPT

## 2023-10-17 ASSESSMENT — PAIN SCALES - GENERAL: PAINLEVEL: NO PAIN (0)

## 2023-10-17 NOTE — PROGRESS NOTES
Concerns: none, sugars all within normal limits.   No vaginal bleeding, LOF, contractions.  No HA, RUQ pain, N/V, visual changes.  Cervix is 50%/FT/mid position and -3.  Cephalic position confirmed by Leopold maneuvers and cervical exam.  Discussed indications, risks, complications and failure rate of inductions.  Discussed signs of labor and when to call or come in.  NST ordered for this friday.  Biophysical profile 8:8 today..  Labor precautions discussed.  RTC 1 week.    Electronically signed by:  Christiano Conteh M.D.  10/17/2023

## 2023-10-20 ENCOUNTER — ALLIED HEALTH/NURSE VISIT (OUTPATIENT)
Dept: FAMILY MEDICINE | Facility: CLINIC | Age: 28
End: 2023-10-20
Payer: MEDICAID

## 2023-10-20 DIAGNOSIS — O09.93 SUPERVISION OF HIGH RISK PREGNANCY IN THIRD TRIMESTER: Primary | ICD-10-CM

## 2023-10-20 PROCEDURE — 99207 PR NO CHARGE NURSE ONLY: CPT

## 2023-10-20 NOTE — PROGRESS NOTES
NST Start: 2:02 PM           End:  2:22 PM    Huddled with Dr. Cutler.  NST good reading patient can leave.    Francia Hernandez MA

## 2023-10-24 ENCOUNTER — PRENATAL OFFICE VISIT (OUTPATIENT)
Dept: FAMILY MEDICINE | Facility: CLINIC | Age: 28
End: 2023-10-24
Payer: MEDICAID

## 2023-10-24 ENCOUNTER — HOSPITAL ENCOUNTER (OUTPATIENT)
Dept: ULTRASOUND IMAGING | Facility: CLINIC | Age: 28
Discharge: HOME | End: 2023-10-24
Attending: NURSE PRACTITIONER | Admitting: NURSE PRACTITIONER
Payer: MEDICAID

## 2023-10-24 VITALS
WEIGHT: 233 LBS | SYSTOLIC BLOOD PRESSURE: 122 MMHG | DIASTOLIC BLOOD PRESSURE: 86 MMHG | BODY MASS INDEX: 38.82 KG/M2 | HEART RATE: 86 BPM | OXYGEN SATURATION: 99 % | RESPIRATION RATE: 20 BRPM | TEMPERATURE: 97.2 F | HEIGHT: 65 IN

## 2023-10-24 DIAGNOSIS — O24.410 GDM, CLASS A1: ICD-10-CM

## 2023-10-24 DIAGNOSIS — O09.93 SUPERVISION OF HIGH RISK PREGNANCY IN THIRD TRIMESTER: Primary | ICD-10-CM

## 2023-10-24 DIAGNOSIS — O24.410 GDM (GESTATIONAL DIABETES MELLITUS), CLASS A1: ICD-10-CM

## 2023-10-24 DIAGNOSIS — O09.93 SUPERVISION OF HIGH RISK PREGNANCY IN THIRD TRIMESTER: ICD-10-CM

## 2023-10-24 PROCEDURE — 76819 FETAL BIOPHYS PROFIL W/O NST: CPT

## 2023-10-24 PROCEDURE — 59426 ANTEPARTUM CARE ONLY: CPT | Performed by: FAMILY MEDICINE

## 2023-10-24 PROCEDURE — 99207 PR COMPLICATED OB VISIT: CPT | Performed by: FAMILY MEDICINE

## 2023-10-24 RX ORDER — TRANEXAMIC ACID 10 MG/ML
1 INJECTION, SOLUTION INTRAVENOUS EVERY 30 MIN PRN
Status: CANCELLED | OUTPATIENT
Start: 2023-10-24

## 2023-10-24 RX ORDER — OXYTOCIN/0.9 % SODIUM CHLORIDE 30/500 ML
100-340 PLASTIC BAG, INJECTION (ML) INTRAVENOUS CONTINUOUS PRN
Status: CANCELLED | OUTPATIENT
Start: 2023-10-24

## 2023-10-24 RX ORDER — ONDANSETRON 4 MG/1
4 TABLET, ORALLY DISINTEGRATING ORAL EVERY 6 HOURS PRN
Status: CANCELLED | OUTPATIENT
Start: 2023-10-24

## 2023-10-24 RX ORDER — OXYTOCIN 10 [USP'U]/ML
10 INJECTION, SOLUTION INTRAMUSCULAR; INTRAVENOUS
Status: CANCELLED | OUTPATIENT
Start: 2023-10-24

## 2023-10-24 RX ORDER — IBUPROFEN 200 MG
800 TABLET ORAL
Status: CANCELLED | OUTPATIENT
Start: 2023-10-24 | End: 2023-10-29

## 2023-10-24 RX ORDER — KETOROLAC TROMETHAMINE 30 MG/ML
30 INJECTION, SOLUTION INTRAMUSCULAR; INTRAVENOUS
Status: CANCELLED | OUTPATIENT
Start: 2023-10-24 | End: 2023-10-29

## 2023-10-24 RX ORDER — DEXTROSE MONOHYDRATE 25 G/50ML
25-50 INJECTION, SOLUTION INTRAVENOUS
Status: CANCELLED | OUTPATIENT
Start: 2023-10-24

## 2023-10-24 RX ORDER — PROCHLORPERAZINE 25 MG
25 SUPPOSITORY, RECTAL RECTAL EVERY 12 HOURS PRN
Status: CANCELLED | OUTPATIENT
Start: 2023-10-24

## 2023-10-24 RX ORDER — CARBOPROST TROMETHAMINE 250 UG/ML
250 INJECTION, SOLUTION INTRAMUSCULAR
Status: CANCELLED | OUTPATIENT
Start: 2023-10-24

## 2023-10-24 RX ORDER — METHYLERGONOVINE MALEATE 0.2 MG/ML
200 INJECTION INTRAVENOUS
Status: CANCELLED | OUTPATIENT
Start: 2023-10-24

## 2023-10-24 RX ORDER — ONDANSETRON 2 MG/ML
4 INJECTION INTRAMUSCULAR; INTRAVENOUS EVERY 6 HOURS PRN
Status: CANCELLED | OUTPATIENT
Start: 2023-10-24

## 2023-10-24 RX ORDER — SODIUM CHLORIDE 9 MG/ML
INJECTION, SOLUTION INTRAVENOUS CONTINUOUS
Status: CANCELLED | OUTPATIENT
Start: 2023-10-24

## 2023-10-24 RX ORDER — SODIUM CHLORIDE, SODIUM LACTATE, POTASSIUM CHLORIDE, CALCIUM CHLORIDE 600; 310; 30; 20 MG/100ML; MG/100ML; MG/100ML; MG/100ML
INJECTION, SOLUTION INTRAVENOUS CONTINUOUS PRN
Status: CANCELLED | OUTPATIENT
Start: 2023-10-24

## 2023-10-24 RX ORDER — TERBUTALINE SULFATE 1 MG/ML
0.25 INJECTION, SOLUTION SUBCUTANEOUS
Status: CANCELLED | OUTPATIENT
Start: 2023-10-24

## 2023-10-24 RX ORDER — HYDROXYZINE HYDROCHLORIDE 50 MG/1
100 TABLET, FILM COATED ORAL
Status: CANCELLED | OUTPATIENT
Start: 2023-10-24

## 2023-10-24 RX ORDER — METOCLOPRAMIDE 5 MG/1
10 TABLET ORAL EVERY 6 HOURS PRN
Status: CANCELLED | OUTPATIENT
Start: 2023-10-24

## 2023-10-24 RX ORDER — OXYTOCIN/0.9 % SODIUM CHLORIDE 30/500 ML
1-24 PLASTIC BAG, INJECTION (ML) INTRAVENOUS CONTINUOUS
Status: CANCELLED | OUTPATIENT
Start: 2023-10-24

## 2023-10-24 RX ORDER — PROCHLORPERAZINE MALEATE 5 MG
10 TABLET ORAL EVERY 6 HOURS PRN
Status: CANCELLED | OUTPATIENT
Start: 2023-10-24

## 2023-10-24 RX ORDER — NALOXONE HYDROCHLORIDE 0.4 MG/ML
0.2 INJECTION, SOLUTION INTRAMUSCULAR; INTRAVENOUS; SUBCUTANEOUS
Status: CANCELLED | OUTPATIENT
Start: 2023-10-24

## 2023-10-24 RX ORDER — NALOXONE HYDROCHLORIDE 0.4 MG/ML
0.4 INJECTION, SOLUTION INTRAMUSCULAR; INTRAVENOUS; SUBCUTANEOUS
Status: CANCELLED | OUTPATIENT
Start: 2023-10-24

## 2023-10-24 RX ORDER — FENTANYL CITRATE 50 UG/ML
100 INJECTION, SOLUTION INTRAMUSCULAR; INTRAVENOUS
Status: CANCELLED | OUTPATIENT
Start: 2023-10-24

## 2023-10-24 RX ORDER — MISOPROSTOL 200 UG/1
400 TABLET ORAL
Status: CANCELLED | OUTPATIENT
Start: 2023-10-24

## 2023-10-24 RX ORDER — METOCLOPRAMIDE HYDROCHLORIDE 5 MG/ML
10 INJECTION INTRAMUSCULAR; INTRAVENOUS EVERY 6 HOURS PRN
Status: CANCELLED | OUTPATIENT
Start: 2023-10-24

## 2023-10-24 RX ORDER — OXYTOCIN/0.9 % SODIUM CHLORIDE 30/500 ML
340 PLASTIC BAG, INJECTION (ML) INTRAVENOUS CONTINUOUS PRN
Status: CANCELLED | OUTPATIENT
Start: 2023-10-24

## 2023-10-24 RX ORDER — CITRIC ACID/SODIUM CITRATE 334-500MG
30 SOLUTION, ORAL ORAL
Status: CANCELLED | OUTPATIENT
Start: 2023-10-24

## 2023-10-24 RX ORDER — MISOPROSTOL 100 UG/1
25 TABLET ORAL EVERY 4 HOURS PRN
Status: CANCELLED | OUTPATIENT
Start: 2023-10-24

## 2023-10-24 RX ORDER — LIDOCAINE 40 MG/G
CREAM TOPICAL
Status: CANCELLED | OUTPATIENT
Start: 2023-10-24

## 2023-10-24 RX ORDER — NICOTINE POLACRILEX 4 MG
15-30 LOZENGE BUCCAL
Status: CANCELLED | OUTPATIENT
Start: 2023-10-24

## 2023-10-24 ASSESSMENT — PAIN SCALES - GENERAL: PAINLEVEL: NO PAIN (0)

## 2023-10-24 NOTE — PROGRESS NOTES
Concerns: none, sugars all WNLs  No vaginal bleeding, LOF, contractions.  No HA, RUQ pain, N/V, visual changes.  Cervix is 1 cm/50%/-3 soft.  Cephalic position confirmed by Leopold maneuvers and cervical exam.  Discussed indications, risks, complications and failure rate of inductions.  Discussed signs of labor and when to call or come in. Planned induction for 10/25-26/2023 with cytotec and then pitocin.    Reportable signs and symptoms discussed.  Labor precautions discussed.  Returning tomorrow evening for cytotec.    Electronically signed by:  Christiano Conteh M.D.  10/24/2023

## 2023-10-26 ENCOUNTER — HOSPITAL ENCOUNTER (INPATIENT)
Facility: CLINIC | Age: 28
LOS: 3 days | Discharge: HOME | End: 2023-10-29
Attending: FAMILY MEDICINE | Admitting: FAMILY MEDICINE
Payer: MEDICAID

## 2023-10-26 DIAGNOSIS — O13.9 GESTATIONAL HYPERTENSION, ANTEPARTUM: ICD-10-CM

## 2023-10-26 DIAGNOSIS — O09.93 SUPERVISION OF HIGH RISK PREGNANCY IN THIRD TRIMESTER: ICD-10-CM

## 2023-10-26 DIAGNOSIS — O24.410 GDM, CLASS A1: Primary | ICD-10-CM

## 2023-10-26 LAB
ABO/RH(D): NORMAL
ANTIBODY SCREEN: NEGATIVE
ERYTHROCYTE [DISTWIDTH] IN BLOOD BY AUTOMATED COUNT: 13.7 % (ref 10–15)
GLUCOSE BLDC GLUCOMTR-MCNC: 98 MG/DL (ref 70–99)
GLUCOSE BLDC GLUCOMTR-MCNC: 98 MG/DL (ref 70–99)
HCT VFR BLD AUTO: 38 % (ref 35–47)
HGB BLD-MCNC: 12.3 G/DL (ref 11.7–15.7)
MCH RBC QN AUTO: 31.2 PG (ref 26.5–33)
MCHC RBC AUTO-ENTMCNC: 32.4 G/DL (ref 31.5–36.5)
MCV RBC AUTO: 96 FL (ref 78–100)
PLATELET # BLD AUTO: 168 10E3/UL (ref 150–450)
RBC # BLD AUTO: 3.94 10E6/UL (ref 3.8–5.2)
SPECIMEN EXPIRATION DATE: NORMAL
WBC # BLD AUTO: 10.5 10E3/UL (ref 4–11)

## 2023-10-26 PROCEDURE — 86901 BLOOD TYPING SEROLOGIC RH(D): CPT | Performed by: FAMILY MEDICINE

## 2023-10-26 PROCEDURE — 120N000001 HC R&B MED SURG/OB

## 2023-10-26 PROCEDURE — 250N000013 HC RX MED GY IP 250 OP 250 PS 637: Performed by: FAMILY MEDICINE

## 2023-10-26 PROCEDURE — 85027 COMPLETE CBC AUTOMATED: CPT | Performed by: FAMILY MEDICINE

## 2023-10-26 PROCEDURE — 36415 COLL VENOUS BLD VENIPUNCTURE: CPT | Performed by: FAMILY MEDICINE

## 2023-10-26 RX ORDER — SODIUM CHLORIDE 9 MG/ML
INJECTION, SOLUTION INTRAVENOUS CONTINUOUS
Status: DISCONTINUED | OUTPATIENT
Start: 2023-10-26 | End: 2023-10-27

## 2023-10-26 RX ORDER — CITRIC ACID/SODIUM CITRATE 334-500MG
30 SOLUTION, ORAL ORAL
Status: DISCONTINUED | OUTPATIENT
Start: 2023-10-26 | End: 2023-10-27

## 2023-10-26 RX ORDER — NALOXONE HYDROCHLORIDE 0.4 MG/ML
0.4 INJECTION, SOLUTION INTRAMUSCULAR; INTRAVENOUS; SUBCUTANEOUS
Status: DISCONTINUED | OUTPATIENT
Start: 2023-10-26 | End: 2023-10-27

## 2023-10-26 RX ORDER — CARBOPROST TROMETHAMINE 250 UG/ML
250 INJECTION, SOLUTION INTRAMUSCULAR
Status: DISCONTINUED | OUTPATIENT
Start: 2023-10-26 | End: 2023-10-27

## 2023-10-26 RX ORDER — PROCHLORPERAZINE 25 MG
25 SUPPOSITORY, RECTAL RECTAL EVERY 12 HOURS PRN
Status: DISCONTINUED | OUTPATIENT
Start: 2023-10-26 | End: 2023-10-27

## 2023-10-26 RX ORDER — METHYLERGONOVINE MALEATE 0.2 MG/ML
200 INJECTION INTRAVENOUS
Status: DISCONTINUED | OUTPATIENT
Start: 2023-10-26 | End: 2023-10-27

## 2023-10-26 RX ORDER — TERBUTALINE SULFATE 1 MG/ML
0.25 INJECTION, SOLUTION SUBCUTANEOUS
Status: DISCONTINUED | OUTPATIENT
Start: 2023-10-26 | End: 2023-10-27

## 2023-10-26 RX ORDER — MISOPROSTOL 100 UG/1
25 TABLET ORAL EVERY 4 HOURS PRN
Status: DISCONTINUED | OUTPATIENT
Start: 2023-10-26 | End: 2023-10-27

## 2023-10-26 RX ORDER — METOCLOPRAMIDE HYDROCHLORIDE 5 MG/ML
10 INJECTION INTRAMUSCULAR; INTRAVENOUS EVERY 6 HOURS PRN
Status: DISCONTINUED | OUTPATIENT
Start: 2023-10-26 | End: 2023-10-27

## 2023-10-26 RX ORDER — TRANEXAMIC ACID 10 MG/ML
1 INJECTION, SOLUTION INTRAVENOUS EVERY 30 MIN PRN
Status: DISCONTINUED | OUTPATIENT
Start: 2023-10-26 | End: 2023-10-27

## 2023-10-26 RX ORDER — NICOTINE POLACRILEX 4 MG
15-30 LOZENGE BUCCAL
Status: DISCONTINUED | OUTPATIENT
Start: 2023-10-26 | End: 2023-10-27

## 2023-10-26 RX ORDER — LIDOCAINE 40 MG/G
CREAM TOPICAL
Status: DISCONTINUED | OUTPATIENT
Start: 2023-10-26 | End: 2023-10-27

## 2023-10-26 RX ORDER — SODIUM CHLORIDE, SODIUM LACTATE, POTASSIUM CHLORIDE, CALCIUM CHLORIDE 600; 310; 30; 20 MG/100ML; MG/100ML; MG/100ML; MG/100ML
INJECTION, SOLUTION INTRAVENOUS CONTINUOUS PRN
Status: DISCONTINUED | OUTPATIENT
Start: 2023-10-26 | End: 2023-10-27

## 2023-10-26 RX ORDER — MISOPROSTOL 200 UG/1
400 TABLET ORAL
Status: DISCONTINUED | OUTPATIENT
Start: 2023-10-26 | End: 2023-10-27

## 2023-10-26 RX ORDER — OXYTOCIN/0.9 % SODIUM CHLORIDE 30/500 ML
1-24 PLASTIC BAG, INJECTION (ML) INTRAVENOUS CONTINUOUS
Status: DISCONTINUED | OUTPATIENT
Start: 2023-10-26 | End: 2023-10-27

## 2023-10-26 RX ORDER — PROCHLORPERAZINE MALEATE 5 MG
10 TABLET ORAL EVERY 6 HOURS PRN
Status: DISCONTINUED | OUTPATIENT
Start: 2023-10-26 | End: 2023-10-27

## 2023-10-26 RX ORDER — FENTANYL CITRATE 50 UG/ML
100 INJECTION, SOLUTION INTRAMUSCULAR; INTRAVENOUS
Status: DISCONTINUED | OUTPATIENT
Start: 2023-10-26 | End: 2023-10-27

## 2023-10-26 RX ORDER — DEXTROSE MONOHYDRATE 25 G/50ML
25-50 INJECTION, SOLUTION INTRAVENOUS
Status: DISCONTINUED | OUTPATIENT
Start: 2023-10-26 | End: 2023-10-27

## 2023-10-26 RX ORDER — OXYTOCIN/0.9 % SODIUM CHLORIDE 30/500 ML
340 PLASTIC BAG, INJECTION (ML) INTRAVENOUS CONTINUOUS PRN
Status: DISCONTINUED | OUTPATIENT
Start: 2023-10-26 | End: 2023-10-27

## 2023-10-26 RX ORDER — NALOXONE HYDROCHLORIDE 0.4 MG/ML
0.2 INJECTION, SOLUTION INTRAMUSCULAR; INTRAVENOUS; SUBCUTANEOUS
Status: DISCONTINUED | OUTPATIENT
Start: 2023-10-26 | End: 2023-10-27

## 2023-10-26 RX ORDER — HYDROXYZINE HYDROCHLORIDE 50 MG/1
100 TABLET, FILM COATED ORAL
Status: DISCONTINUED | OUTPATIENT
Start: 2023-10-26 | End: 2023-10-27

## 2023-10-26 RX ORDER — ONDANSETRON 2 MG/ML
4 INJECTION INTRAMUSCULAR; INTRAVENOUS EVERY 6 HOURS PRN
Status: DISCONTINUED | OUTPATIENT
Start: 2023-10-26 | End: 2023-10-27

## 2023-10-26 RX ORDER — OXYTOCIN 10 [USP'U]/ML
10 INJECTION, SOLUTION INTRAMUSCULAR; INTRAVENOUS
Status: DISCONTINUED | OUTPATIENT
Start: 2023-10-26 | End: 2023-10-27

## 2023-10-26 RX ORDER — ONDANSETRON 4 MG/1
4 TABLET, ORALLY DISINTEGRATING ORAL EVERY 6 HOURS PRN
Status: DISCONTINUED | OUTPATIENT
Start: 2023-10-26 | End: 2023-10-27

## 2023-10-26 RX ORDER — METOCLOPRAMIDE 5 MG/1
10 TABLET ORAL EVERY 6 HOURS PRN
Status: DISCONTINUED | OUTPATIENT
Start: 2023-10-26 | End: 2023-10-27

## 2023-10-26 RX ADMIN — MISOPROSTOL 25 MCG: 100 TABLET ORAL at 20:54

## 2023-10-26 ASSESSMENT — ACTIVITIES OF DAILY LIVING (ADL)
ADLS_ACUITY_SCORE: 18
ADLS_ACUITY_SCORE: 18

## 2023-10-27 ENCOUNTER — ANESTHESIA EVENT (OUTPATIENT)
Dept: OBGYN | Facility: CLINIC | Age: 28
End: 2023-10-27
Payer: MEDICAID

## 2023-10-27 ENCOUNTER — ANESTHESIA (OUTPATIENT)
Dept: OBGYN | Facility: CLINIC | Age: 28
End: 2023-10-27
Payer: MEDICAID

## 2023-10-27 LAB
GLUCOSE BLDC GLUCOMTR-MCNC: 100 MG/DL (ref 70–99)
GLUCOSE BLDC GLUCOMTR-MCNC: 105 MG/DL (ref 70–99)
GLUCOSE BLDC GLUCOMTR-MCNC: 106 MG/DL (ref 70–99)
GLUCOSE BLDC GLUCOMTR-MCNC: 106 MG/DL (ref 70–99)
GLUCOSE BLDC GLUCOMTR-MCNC: 88 MG/DL (ref 70–99)
GLUCOSE BLDC GLUCOMTR-MCNC: 93 MG/DL (ref 70–99)
GLUCOSE BLDC GLUCOMTR-MCNC: 97 MG/DL (ref 70–99)

## 2023-10-27 PROCEDURE — 250N000011 HC RX IP 250 OP 636: Mod: JZ | Performed by: NURSE ANESTHETIST, CERTIFIED REGISTERED

## 2023-10-27 PROCEDURE — 250N000011 HC RX IP 250 OP 636: Performed by: NURSE ANESTHETIST, CERTIFIED REGISTERED

## 2023-10-27 PROCEDURE — 370N000003 HC ANESTHESIA WARD SERVICE: Performed by: NURSE ANESTHETIST, CERTIFIED REGISTERED

## 2023-10-27 PROCEDURE — 250N000011 HC RX IP 250 OP 636

## 2023-10-27 PROCEDURE — 120N000001 HC R&B MED SURG/OB

## 2023-10-27 PROCEDURE — 722N000001 HC LABOR CARE VAGINAL DELIVERY SINGLE

## 2023-10-27 PROCEDURE — 258N000003 HC RX IP 258 OP 636: Performed by: FAMILY MEDICINE

## 2023-10-27 PROCEDURE — 250N000009 HC RX 250: Performed by: FAMILY MEDICINE

## 2023-10-27 PROCEDURE — 250N000011 HC RX IP 250 OP 636: Performed by: FAMILY MEDICINE

## 2023-10-27 PROCEDURE — 3E0R3BZ INTRODUCTION OF ANESTHETIC AGENT INTO SPINAL CANAL, PERCUTANEOUS APPROACH: ICD-10-PCS | Performed by: NURSE ANESTHETIST, CERTIFIED REGISTERED

## 2023-10-27 PROCEDURE — 00HU33Z INSERTION OF INFUSION DEVICE INTO SPINAL CANAL, PERCUTANEOUS APPROACH: ICD-10-PCS | Performed by: NURSE ANESTHETIST, CERTIFIED REGISTERED

## 2023-10-27 PROCEDURE — 250N000013 HC RX MED GY IP 250 OP 250 PS 637: Performed by: FAMILY MEDICINE

## 2023-10-27 PROCEDURE — 59410 OBSTETRICAL CARE: CPT | Performed by: FAMILY MEDICINE

## 2023-10-27 PROCEDURE — 250N000009 HC RX 250: Performed by: NURSE ANESTHETIST, CERTIFIED REGISTERED

## 2023-10-27 RX ORDER — ACETAMINOPHEN 325 MG/1
650 TABLET ORAL EVERY 4 HOURS PRN
Status: DISCONTINUED | OUTPATIENT
Start: 2023-10-27 | End: 2023-10-29 | Stop reason: HOSPADM

## 2023-10-27 RX ORDER — LIDOCAINE 40 MG/G
CREAM TOPICAL
Status: DISCONTINUED | OUTPATIENT
Start: 2023-10-27 | End: 2023-10-29 | Stop reason: HOSPADM

## 2023-10-27 RX ORDER — TRANEXAMIC ACID 10 MG/ML
1 INJECTION, SOLUTION INTRAVENOUS EVERY 30 MIN PRN
Status: DISCONTINUED | OUTPATIENT
Start: 2023-10-27 | End: 2023-10-29 | Stop reason: HOSPADM

## 2023-10-27 RX ORDER — LIDOCAINE HYDROCHLORIDE 10 MG/ML
INJECTION, SOLUTION INFILTRATION; PERINEURAL
Status: COMPLETED
Start: 2023-10-27 | End: 2023-10-27

## 2023-10-27 RX ORDER — HYDRALAZINE HYDROCHLORIDE 20 MG/ML
10 INJECTION INTRAMUSCULAR; INTRAVENOUS
Status: DISCONTINUED | OUTPATIENT
Start: 2023-10-27 | End: 2023-10-29 | Stop reason: HOSPADM

## 2023-10-27 RX ORDER — NICOTINE POLACRILEX 4 MG
15-30 LOZENGE BUCCAL
Status: DISCONTINUED | OUTPATIENT
Start: 2023-10-27 | End: 2023-10-27

## 2023-10-27 RX ORDER — BUPIVACAINE HYDROCHLORIDE 2.5 MG/ML
INJECTION, SOLUTION EPIDURAL; INFILTRATION; INTRACAUDAL PRN
Status: DISCONTINUED | OUTPATIENT
Start: 2023-10-27 | End: 2023-10-27

## 2023-10-27 RX ORDER — DOCUSATE SODIUM 100 MG/1
100 CAPSULE, LIQUID FILLED ORAL DAILY
Status: DISCONTINUED | OUTPATIENT
Start: 2023-10-28 | End: 2023-10-29 | Stop reason: HOSPADM

## 2023-10-27 RX ORDER — LIDOCAINE HYDROCHLORIDE AND EPINEPHRINE 15; 5 MG/ML; UG/ML
INJECTION, SOLUTION EPIDURAL PRN
Status: DISCONTINUED | OUTPATIENT
Start: 2023-10-27 | End: 2023-10-27

## 2023-10-27 RX ORDER — HYDROCORTISONE 25 MG/G
CREAM TOPICAL 3 TIMES DAILY PRN
Status: DISCONTINUED | OUTPATIENT
Start: 2023-10-27 | End: 2023-10-29 | Stop reason: HOSPADM

## 2023-10-27 RX ORDER — SODIUM CHLORIDE, SODIUM LACTATE, POTASSIUM CHLORIDE, CALCIUM CHLORIDE 600; 310; 30; 20 MG/100ML; MG/100ML; MG/100ML; MG/100ML
10-125 INJECTION, SOLUTION INTRAVENOUS CONTINUOUS
Status: DISCONTINUED | OUTPATIENT
Start: 2023-10-27 | End: 2023-10-29 | Stop reason: HOSPADM

## 2023-10-27 RX ORDER — METHYLERGONOVINE MALEATE 0.2 MG/ML
200 INJECTION INTRAVENOUS
Status: DISCONTINUED | OUTPATIENT
Start: 2023-10-27 | End: 2023-10-29 | Stop reason: HOSPADM

## 2023-10-27 RX ORDER — CARBOPROST TROMETHAMINE 250 UG/ML
250 INJECTION, SOLUTION INTRAMUSCULAR
Status: DISCONTINUED | OUTPATIENT
Start: 2023-10-27 | End: 2023-10-29 | Stop reason: HOSPADM

## 2023-10-27 RX ORDER — OXYTOCIN/0.9 % SODIUM CHLORIDE 30/500 ML
340 PLASTIC BAG, INJECTION (ML) INTRAVENOUS CONTINUOUS PRN
Status: DISCONTINUED | OUTPATIENT
Start: 2023-10-27 | End: 2023-10-29 | Stop reason: HOSPADM

## 2023-10-27 RX ORDER — PRENATAL VIT/IRON FUM/FOLIC AC 27MG-0.8MG
1 TABLET ORAL DAILY
Status: DISCONTINUED | OUTPATIENT
Start: 2023-10-28 | End: 2023-10-29 | Stop reason: HOSPADM

## 2023-10-27 RX ORDER — SODIUM CHLORIDE 9 MG/ML
INJECTION, SOLUTION INTRAVENOUS CONTINUOUS
Status: DISCONTINUED | OUTPATIENT
Start: 2023-10-27 | End: 2023-10-27

## 2023-10-27 RX ORDER — LABETALOL HYDROCHLORIDE 5 MG/ML
20-80 INJECTION, SOLUTION INTRAVENOUS EVERY 10 MIN PRN
Status: DISCONTINUED | OUTPATIENT
Start: 2023-10-27 | End: 2023-10-29 | Stop reason: HOSPADM

## 2023-10-27 RX ORDER — IBUPROFEN 800 MG/1
800 TABLET, FILM COATED ORAL EVERY 6 HOURS PRN
Status: DISCONTINUED | OUTPATIENT
Start: 2023-10-27 | End: 2023-10-29 | Stop reason: HOSPADM

## 2023-10-27 RX ORDER — MODIFIED LANOLIN
OINTMENT (GRAM) TOPICAL
Status: DISCONTINUED | OUTPATIENT
Start: 2023-10-27 | End: 2023-10-29 | Stop reason: HOSPADM

## 2023-10-27 RX ORDER — NALBUPHINE HYDROCHLORIDE 10 MG/ML
2.5-5 INJECTION, SOLUTION INTRAMUSCULAR; INTRAVENOUS; SUBCUTANEOUS EVERY 6 HOURS PRN
Status: DISCONTINUED | OUTPATIENT
Start: 2023-10-27 | End: 2023-10-27

## 2023-10-27 RX ORDER — BISACODYL 10 MG
10 SUPPOSITORY, RECTAL RECTAL DAILY PRN
Status: DISCONTINUED | OUTPATIENT
Start: 2023-10-27 | End: 2023-10-29 | Stop reason: HOSPADM

## 2023-10-27 RX ORDER — OXYTOCIN 10 [USP'U]/ML
10 INJECTION, SOLUTION INTRAMUSCULAR; INTRAVENOUS
Status: DISCONTINUED | OUTPATIENT
Start: 2023-10-27 | End: 2023-10-29 | Stop reason: HOSPADM

## 2023-10-27 RX ORDER — FENTANYL CITRATE-0.9 % NACL/PF 10 MCG/ML
100 PLASTIC BAG, INJECTION (ML) INTRAVENOUS EVERY 5 MIN PRN
Status: DISCONTINUED | OUTPATIENT
Start: 2023-10-27 | End: 2023-10-27

## 2023-10-27 RX ORDER — FENTANYL/BUPIVACAINE/NS/PF 2-1250MCG
PLASTIC BAG, INJECTION (ML) INJECTION
Status: COMPLETED
Start: 2023-10-27 | End: 2023-10-27

## 2023-10-27 RX ORDER — DEXTROSE MONOHYDRATE 25 G/50ML
25-50 INJECTION, SOLUTION INTRAVENOUS
Status: DISCONTINUED | OUTPATIENT
Start: 2023-10-27 | End: 2023-10-27

## 2023-10-27 RX ORDER — MISOPROSTOL 200 UG/1
400 TABLET ORAL
Status: DISCONTINUED | OUTPATIENT
Start: 2023-10-27 | End: 2023-10-29 | Stop reason: HOSPADM

## 2023-10-27 RX ADMIN — Medication: at 19:29

## 2023-10-27 RX ADMIN — Medication 8 MILLI-UNITS/MIN: at 20:10

## 2023-10-27 RX ADMIN — LIDOCAINE HYDROCHLORIDE,EPINEPHRINE BITARTRATE 3 ML: 15; .005 INJECTION, SOLUTION EPIDURAL; INFILTRATION; INTRACAUDAL; PERINEURAL at 05:19

## 2023-10-27 RX ADMIN — LIDOCAINE HYDROCHLORIDE,EPINEPHRINE BITARTRATE 2 ML: 15; .005 INJECTION, SOLUTION EPIDURAL; INFILTRATION; INTRACAUDAL; PERINEURAL at 05:15

## 2023-10-27 RX ADMIN — FENTANYL CITRATE 100 MCG: 50 INJECTION, SOLUTION INTRAMUSCULAR; INTRAVENOUS at 04:43

## 2023-10-27 RX ADMIN — Medication: at 12:24

## 2023-10-27 RX ADMIN — FENTANYL CITRATE 100 MCG: 50 INJECTION, SOLUTION INTRAMUSCULAR; INTRAVENOUS at 05:07

## 2023-10-27 RX ADMIN — Medication 10 ML/HR: at 05:22

## 2023-10-27 RX ADMIN — Medication 2 MILLI-UNITS/MIN: at 11:12

## 2023-10-27 RX ADMIN — SODIUM CHLORIDE, POTASSIUM CHLORIDE, SODIUM LACTATE AND CALCIUM CHLORIDE 1000 ML: 600; 310; 30; 20 INJECTION, SOLUTION INTRAVENOUS at 05:10

## 2023-10-27 RX ADMIN — LABETALOL HYDROCHLORIDE 20 MG: 5 INJECTION INTRAVENOUS at 22:11

## 2023-10-27 RX ADMIN — BUPIVACAINE HYDROCHLORIDE 3 ML: 2.5 INJECTION, SOLUTION EPIDURAL; INFILTRATION; INTRACAUDAL at 05:23

## 2023-10-27 RX ADMIN — BUPIVACAINE HYDROCHLORIDE 5 ML: 2.5 INJECTION, SOLUTION EPIDURAL; INFILTRATION; INTRACAUDAL at 05:19

## 2023-10-27 RX ADMIN — MISOPROSTOL 25 MCG: 100 TABLET ORAL at 00:58

## 2023-10-27 RX ADMIN — SODIUM CHLORIDE, POTASSIUM CHLORIDE, SODIUM LACTATE AND CALCIUM CHLORIDE: 600; 310; 30; 20 INJECTION, SOLUTION INTRAVENOUS at 11:04

## 2023-10-27 ASSESSMENT — ACTIVITIES OF DAILY LIVING (ADL)
ADLS_ACUITY_SCORE: 18

## 2023-10-27 NOTE — PROGRESS NOTES
Cervix unchanged. Pitocin was at 11ml/hr when patient repositioned onto left side with peanut ball. Late decels started, peanut ball removed. Pitocin decreased to 6ml/hr. Pt had two systolic pressures in the 140s, provider aware and patient is asymptomatic.

## 2023-10-27 NOTE — PROGRESS NOTES
Patient was 5.5/%/-2 at 0743. Patient comfortable with epidural. Difficulty picking up ctx pattern with external monitors. Siena reported to not work well with patient. RN palpating ctx q1-3. Dr. Elise notified of cervical exam and ctx pattern not picking up well, no plans for internal monitoring at this time unless FHR pattern changes. No current orders were in for blood sugar monitoring, Dr. Elise put orders in for q2h checks. One blood pressure of 150/87. Pt denies preeclampsia symptoms. Pt repositioned as needed. St cath'd for 100ml.

## 2023-10-27 NOTE — PROGRESS NOTES
Monitored pt during epidural placement and care up til now.  Will co report to the next sift.  Pt denies discomfort.

## 2023-10-27 NOTE — PROGRESS NOTES
Pitocin was increased to as tolerated and per adequate MVUs. At 1417, pt had prolonged late decel. Pt was repositioned out of current position, late decels continued and pitocin was decreased to 3ml/hr. Dr. Elise at bedside during this time. Straight cath was attempted at 1505 with 100ml output, provider aware of patient output of 200ml since start of shift. SVE was 7cm/%/-1. Vitals remain stable. Pitocin was increased at 1518 per provider OK.

## 2023-10-27 NOTE — PROGRESS NOTES
Data: Patient presented to [facility] on 10/26/2023  @ 1930.  Patient admitted for induction of for GDM. Patient is a .  Prenatal record reviewed. Pregnancy has been complicated by GDM diet controlled .  Gestational Age 40w5d. VSS. Fetal movement present. Patient reports not feeling uterine contractions, denies leaking of vaginal fluid/rupture of membranes, vaginal bleeding, abdominal pain, epigastric or RUQ pain. Support person DEVI Álvarez, is present.   Action: Verbal consent for EFM.  Admission assessment completed. Bill of rights reviewed.IV placed, routine labs drawn. Cervical exam indicated 1-2cm/50%/-3, soft, posterior. Tolerated exam well. Blood sugar 98.  Response: Patient verbalized agreement with plan. Dr. Cutler contacted. Telephone verbal order to begin ripening with cytotec overnight.

## 2023-10-27 NOTE — PROGRESS NOTES
Pitocin currently at 9ml/hr. SVE 9cm/0. Pt reports feeling comfortable with epidural in place. Positioned into [facility].

## 2023-10-27 NOTE — PROGRESS NOTES
Cervical exam at 0945 was unchanged. Contractions palpating moderate but difficult to  with external monitors. Dr. Elise updated and IUPC placed at 1015. MVUs initially at 130-145. VORB to start pitocin. Pitocin started at 1112.

## 2023-10-27 NOTE — ANESTHESIA PROCEDURE NOTES
Epidural catheter Procedure Note    Pre-Procedure   Staff -        CRNA: Diana Barlow APRN CRNA       Performed By: CRNA       Location: OB       Procedure Start/Stop Times: 10/27/2023 5:05 AM and 10/27/2023 5:15 AM       Pre-Anesthestic Checklist: patient identified, IV checked, risks and benefits discussed, informed consent, monitors and equipment checked, pre-op evaluation and at physician/surgeon's request  Timeout:       Correct Patient: Yes        Correct Procedure: Yes        Correct Site: Yes        Correct Position: Yes   Procedure Documentation  Procedure: epidural catheter       Diagnosis: Labor pain       Patient Position: sitting       Patient Prep/Sterile Barriers: sterile gloves, mask, patient draped       Skin prep: Betadine       Local skin infiltrated with 3 mL of 1% lidocaine.        Insertion Site: L3-4. (midline approach).       Technique: LORT air        FRANCIS at 10 cm.       Needle Type: Yina needle and Bergeron       Needle Gauge: 18.        Needle Length (Inches): 3.5        Catheter: 20 G.          Catheter threaded easily.         5 cm epidural space.         Threaded 15 cm at skin.         # of attempts: 1 and  # of redirects:  0    Assessment/Narrative         Paresthesias: No.       Test dose of 3 mL lidocaine 1.5% w/ 1:200,000 epinephrine at.         Test dose negative, 3 minutes after injection, for signs of intravascular, subdural, or intrathecal injection.       Insertion/Infusion Method: LORT air       Aspiration negative for Heme or CSF via Epidural Catheter.       Sensory Level Left: T6.       Sensory Level Right: T6.    Medication(s) Administered   Medication Administration Time: 10/27/2023 5:05 AM     Comments:  Patient sitting at bedside. Risks, benefits and alternatives of epidural analgesia discussed with patient  Her questions were answered, and she consents/wishes to proceed as planned.  Back exam landmarks identified, skin prep with betadine.  Lidocaine infiltration  "at L3/4. ES identified via FRANCIS (Air) after 1 attempt (s) and 0 redirect (s).  EC passed 5 centimeters without paresthesias or resistance noted.  Negative heme / CSF aspirated.  EC securred with tegaderm/medipore tape. Patient appeared to tolerate procedure well. No apparent complications.          FOR [facility] ONLY:   Pain Team Contact information: please page the Pain Team Via [employer]. Search \"Pain\". During daytime hours, please page the attending first. At night please page the resident first.      "

## 2023-10-27 NOTE — PROGRESS NOTES
Having difficulty picking up FHT.  Discussed FSE placement with patient.  She wished to proceed.  , reasuring, Ctx q 2 minutes on pitocin.  Anticipate NVD.

## 2023-10-27 NOTE — PROGRESS NOTES
Cx:  7/100/-1 at 3 PM.  Pt making slow cervical change.  Will increase pitocin as tolerated by fetus to facilitate cervical dilation.

## 2023-10-27 NOTE — PROGRESS NOTES
Patient up to void. Reviewed plan of care with patient and support person, no additional questions or concerns. Patient understanding and agreeable to plan of care. 1st dose cytotec at 2100. Patient tolerated well and laying in bed.

## 2023-10-27 NOTE — PROGRESS NOTES
Dr. Cutler called and updated with patient's q2h blood sugars, both 98. Patient is diet controlled GDM. Dr. Cutler telephone VORB to discontinue q2h checks overnight. No further orders given at this time.

## 2023-10-27 NOTE — PROGRESS NOTES
Cervical exam indicated unchanged cervix. Patient tolerating irregular ctx well- apprehensive to most of them, resting in bed. Ctx q3-8, lasting 40-70seconds. Abdomen mild to palpation with ctx and soft at resting tone. , mod variability, accels present, no decels. 2nd dose cytotec placed. VSS, no s/s hypoglycemia per patient report. Patient laying on side in bed.

## 2023-10-27 NOTE — PROGRESS NOTES
Pt with slow cervical dilatation progress.  Difficulty monitoring contractions externally.  Discussed placement of IUPC.  Pt agreed after informed consent.  IUPC placed without complication.  [id number], reassuring.  Cx:  5.5/100/-2.  Will add pictocin if inadequate contraction pattern.

## 2023-10-27 NOTE — ANESTHESIA PREPROCEDURE EVALUATION
"Anesthesia Pre-Procedure Evaluation    Patient: Chely Dougherty   MRN: [id number] : 1995        Procedure :           Past Medical History:   Diagnosis Date    NO ACTIVE PROBLEMS     Td due       Past Surgical History:   Procedure Laterality Date    WISDOM TOOTH EXTRACTION        Allergies   Allergen Reactions    No Known Drug Allergy       Social History     Tobacco Use    Smoking status: Former     Packs/day: .5     Types: Cigarettes     Quit date: 10/11/2022     Years since quittin.0    Smokeless tobacco: Never   Substance Use Topics    Alcohol use: Not Currently     Comment: Occ social      Wt Readings from Last 1 Encounters:   10/24/23 105.7 kg (233 lb)        Anesthesia Evaluation   Pt has not had prior anesthetic         ROS/MED HX  ENT/Pulmonary:  - neg pulmonary ROS     Neurologic:  - neg neurologic ROS     Cardiovascular:  - neg cardiovascular ROS     METS/Exercise Tolerance:     Hematologic:  - neg hematologic  ROS     Musculoskeletal:       GI/Hepatic:  - neg GI/hepatic ROS     Renal/Genitourinary:       Endo:     (+)                  gestational diabetes and Diet- controlled,    Psychiatric/Substance Use:  - neg psychiatric ROS     Infectious Disease:       Malignancy:       Other:            Physical Exam    Airway        Mallampati: II   TM distance: > 3 FB   Neck ROM: full   Mouth opening: > 3 cm    Respiratory Devices and Support         Dental  no notable dental history         Cardiovascular   cardiovascular exam normal          Pulmonary   pulmonary exam normal                OUTSIDE LABS:  CBC:   Lab Results   Component Value Date    WBC 10.5 10/26/2023    WBC 7.5 2023    HGB 12.3 10/26/2023    HGB 12.3 2023    HCT 38.0 10/26/2023    HCT 41.0 2023     10/26/2023     2023     BMP:   Lab Results   Component Value Date    GLC 98 10/26/2023    GLC 98 10/26/2023     COAGS: No results found for: \"PTT\", \"INR\", \"FIBR\"  POC: No results found for: " "\"BGM\", \"HCG\", \"HCGS\"  HEPATIC: No results found for: \"ALBUMIN\", \"PROTTOTAL\", \"ALT\", \"AST\", \"GGT\", \"ALKPHOS\", \"BILITOTAL\", \"BILIDIRECT\", \"WALE\"  OTHER: No results found for: \"PH\", \"LACT\", \"A1C\", \"LEILA\", \"PHOS\", \"MAG\", \"LIPASE\", \"AMYLASE\", \"TSH\", \"T4\", \"T3\", \"CRP\", \"SED\"    Anesthesia Plan    ASA Status:  2       Anesthesia Type: Epidural.              Consents    Anesthesia Plan(s) and associated risks, benefits, and realistic alternatives discussed. Questions answered and patient/representative(s) expressed understanding.     - Discussed:     - Discussed with:  Patient            Postoperative Care            Comments:    Other Comments: The risks and benefits of anesthesia, and the alternatives where applicable, have been discussed with the patient, and they wish to proceed.       neg OB ROS.       MARTA Sandoval CRNA  "

## 2023-10-28 LAB
ALBUMIN SERPL BCG-MCNC: 3.3 G/DL (ref 3.5–5.2)
ALP SERPL-CCNC: 116 U/L (ref 35–104)
ALT SERPL W P-5'-P-CCNC: 11 U/L (ref 0–50)
ANION GAP SERPL CALCULATED.3IONS-SCNC: 14 MMOL/L (ref 7–15)
AST SERPL W P-5'-P-CCNC: 29 U/L (ref 0–45)
BILIRUB SERPL-MCNC: 0.3 MG/DL
BUN SERPL-MCNC: 17.1 MG/DL (ref 6–20)
CALCIUM SERPL-MCNC: 9.1 MG/DL (ref 8.6–10)
CHLORIDE SERPL-SCNC: 106 MMOL/L (ref 98–107)
CREAT SERPL-MCNC: 0.94 MG/DL (ref 0.51–0.95)
DEPRECATED HCO3 PLAS-SCNC: 18 MMOL/L (ref 22–29)
EGFRCR SERPLBLD CKD-EPI 2021: 83 ML/MIN/1.73M2
ERYTHROCYTE [DISTWIDTH] IN BLOOD BY AUTOMATED COUNT: 14 % (ref 10–15)
GLUCOSE SERPL-MCNC: 84 MG/DL (ref 70–99)
HCT VFR BLD AUTO: 35.7 % (ref 35–47)
HGB BLD-MCNC: 11.3 G/DL (ref 11.7–15.7)
MCH RBC QN AUTO: 30.9 PG (ref 26.5–33)
MCHC RBC AUTO-ENTMCNC: 31.7 G/DL (ref 31.5–36.5)
MCV RBC AUTO: 98 FL (ref 78–100)
PLATELET # BLD AUTO: 151 10E3/UL (ref 150–450)
POTASSIUM SERPL-SCNC: 3.8 MMOL/L (ref 3.4–5.3)
PROT SERPL-MCNC: 5.5 G/DL (ref 6.4–8.3)
RBC # BLD AUTO: 3.66 10E6/UL (ref 3.8–5.2)
SODIUM SERPL-SCNC: 138 MMOL/L (ref 135–145)
WBC # BLD AUTO: 21.1 10E3/UL (ref 4–11)

## 2023-10-28 PROCEDURE — 120N000001 HC R&B MED SURG/OB

## 2023-10-28 PROCEDURE — 36415 COLL VENOUS BLD VENIPUNCTURE: CPT | Performed by: FAMILY MEDICINE

## 2023-10-28 PROCEDURE — 250N000013 HC RX MED GY IP 250 OP 250 PS 637: Performed by: FAMILY MEDICINE

## 2023-10-28 PROCEDURE — 80053 COMPREHEN METABOLIC PANEL: CPT | Performed by: FAMILY MEDICINE

## 2023-10-28 PROCEDURE — 85027 COMPLETE CBC AUTOMATED: CPT | Performed by: FAMILY MEDICINE

## 2023-10-28 RX ADMIN — DOCUSATE SODIUM 100 MG: 100 CAPSULE, LIQUID FILLED ORAL at 08:09

## 2023-10-28 RX ADMIN — PRENATAL VIT W/ FE FUMARATE-FA TAB 27-0.8 MG 1 TABLET: 27-0.8 TAB at 08:09

## 2023-10-28 RX ADMIN — BENZOCAINE AND LEVOMENTHOL: 200; 5 SPRAY TOPICAL at 23:54

## 2023-10-28 ASSESSMENT — ACTIVITIES OF DAILY LIVING (ADL)
ADLS_ACUITY_SCORE: 18

## 2023-10-28 NOTE — PLAN OF CARE
Goal Outcome Evaluation:    9 hours post vaginal delivery, no laceration. BPs initially after delivery were elevated and required one dose of Labetalol, have since been WNL. Denies S&S of pre-E. Fundus firm at U-1 with light bleeding. Denies need for Ibuprofen or Tylenol. Flatter nipples, does not want to use shield; has nipple everter at bedside. Was able to pump a few mL colostrum to give to baby. Wants to try to breastfeed baby independently. IV remains saline locked at this time.

## 2023-10-28 NOTE — PROGRESS NOTES
S: Transfer to postpartum  B: Vaginal birth @ 2114, no tears, no repair, breast feeding    A: Mother and baby transferred to postpartum unit at 0035 via independent transfer after completion of immediate recovery period. Patient oriented to room. Mother and baby bonding well and in satisfactory condition upon transfer.  R: Anticipate routine postpartum care.

## 2023-10-28 NOTE — PROGRESS NOTES
S: Delivery  B: induced Labor,  @ [id number] gestation, GBS negative   A: Patient delivered Vaginal at  with Dr. Elise in attendance. Baby delivered and placed on mother's low abdomen for delayed cord clamping where baby was dried and stimulated. After cord clamped and cut, baby was taken to warmer at 2 minutes of life for poor respiratory effort, nasal flaring and retractions . Apgars 5/8/10. Placenta was delivered @  followed by administration of oxytocin. Bonding initiated with mom and baby. Educated mother on importance of exclusive breast feeding, expected feeding readiness cues and encouraged her to observe for feeding cues. Mother informed that breast feeding assistance would be provided. See flowsheet for VS and PP checks. Labor care plan goals met.  R: Expect routine postpartum care. Anticipate first feeding within the hour.

## 2023-10-28 NOTE — PROGRESS NOTES
Report given to Rosanne SUMMERS who accepts care. Patient still lying on left side. Pitocin at 6ml/hr. Dr. Elise aware of early decels and will give patient more time before rechecking cervix d/t being checked recently.

## 2023-10-28 NOTE — PROGRESS NOTES
Doing well.  Pain is well-controlled.  No fevers.  No history of foul-smelling vaginal discharge.  Good appetite.  Denies chest pain, shortness of breath, nausea or vomiting.  Vaginal bleeding is decreasing.  Ambulatory.  Pumping breastmilk and syringe feeding.  BPs improving.    /80 (BP Location: Left arm, Patient Position: Sitting, Cuff Size: Adult Regular)   Pulse 96   Temp 97.8  F (36.6  C) (Oral)   Resp 16   LMP 12/23/2022 (Approximate)   SpO2 98%   Breastfeeding Unknown   RRR, no MRG  CTAB  Abdomen benign, Fundus firm at U-1  1+ pitting edema, No LE tenderness    Hemoglobin   Date Value Ref Range Status   10/28/2023 11.3 (L) 11.7 - 15.7 g/dL Final   08/25/2011 12.4 11.7 - 15.7 g/dL Final   ]    A/P:  28 year old [id number] on Pospartum day #1.  Some gestational hypertension.  No e/o preeclampsia.  Doing well.  Continue normal postpartum care.  Anticipate D/C tomorrow.

## 2023-10-28 NOTE — PLAN OF CARE
"Goal Outcome Evaluation:    Blood pressures within parameters. Pt denies preeclampsia symptoms. Pt requested IV to be discontinued today, RN provided education regarding need for IV placement if treatment was needed and patient verbalized understanding and OK with new IV placement if needed. Mother independent with cares. Reports bleeding remains light and denies clots. Voiding spontaneously. Fundus firm, midline, and 1 below U. Ambulating around room appropriately. Patient reports this morning that she was exclusively breast pumping and it was reported overnight that patient did not want assistance with breastfeeding. Later in the day, patient requested to see lactation consultant and would like to try breastfeeding again. RN at bedside for next feeding and asks patient if she would like assistance with feeding and she reported that she will continue to pump and syringe feed because \"infant does not seem interested.\"                           "

## 2023-10-28 NOTE — L&D DELIVERY NOTE
OB Vaginal Delivery Note    Chely Dougherty MRN# [id number]   Age: 28 year old YOB: 1995     Patient brought to labor and delivery for induction of labor due to gestational diabetes at term.  This was diet-controlled.  Patient received 2 doses of misoprostol and started benita.  She also had spontaneous rupture of membranes.  Unfortunately, several hours after contractions started and cervical dilation progressed, she had an ineffective contraction pattern.  Because of this, Pitocin augmentation was started and eventually, IUPC and fetal scalp electrode were placed to help monitor the fetus and ensure adequate contractions.  She had very slow dilatation to complete, but then she only had to push for a little under an hour and a half.    Of note, patient did develop some elevated blood pressures in the last several hours of her labor.  She had no signs or symptoms of preeclampsia.  She did not require intervention for these.    GA: 40w6d  GP:   Labor Complications: Fetal Intolerance   EBL:   mL  Delivery QBL:    Delivery Type: Vaginal, Spontaneous   ROM to Delivery Time: (Delivered) Hours: 16 Minutes: 4   Weight: 3.2 kg (7 lb 0.9 oz)    1 Minute 5 Minute 10 Minute   Apgar Totals: 5   8   10     ERNESTINA BROWNING     Delivery Details:  Chely Dougherty, a 28 year old  female delivered a viable infant with apgars of 5  and 8 . Patient was fully dilated and pushing after 6  hours 54  minutes in active labor. Delivery was via vaginal, spontaneous  to a sterile field under epidural  anesthesia. Infant delivered in vertex  right  occiput  anterior  position. Anterior and posterior shoulders delivered without difficulty. The cord was clamped, cut twice and 3 vessels  were noted. Cord blood was obtained in routine fashion with the following disposition: lab .      Cord complications: nuchal   Placenta delivered at 10/27/2023  9:19 PM . Placental disposition was Hospital disposal .  Fundal massage performed and fundus found to be firm.     Episiotomy: none    Perineum, vagina, cervix were inspected, and the following lacerations were noted:   Perineal lacerations: none                    Excellent hemostasis was noted. Needle count correct. Infant and patient in delivery room in good and stable condition.        Lashay Dougherty [[id number]]      Labor Event Times          Active labor onset date: 10/27/23 Onset time:  1:06 PM   Dilation complete date: 10/27/23 Complete time:  8:00 PM   Start pushing date/time: 10/27/2023 2002          Labor Length      1st Stage (hrs): 6 (min): 54   2nd Stage (hrs): 1 (min): 14   3rd Stage (hrs): 0 (min): 5          Labor Events     labor?: No   steroids: None  Labor Type: Induction/Cervical ripening  Predominate monitoring during 1st stage: continuous electronic fetal monitoring     Antibiotics received during labor?: No       Rupture date/time: 10/27/23 0510   Rupture type: Spontaneous Rupture of Membranes  Fluid color: Clear  Fluid odor: Normal     Induction: Misoprostol  Induction date/time:      Cervical ripening date/time: 10/26/23 2054    Indications for induction: Diabetes (Comment to specify)     Augmentation: Oxytocin  Indications for augmentation: Ineffective Contraction Pattern       Delivery/Placenta Date and Time      Delivery Date: 10/27/23 Delivery Time:  9:14 PM   Placenta Date/Time: 10/27/2023  9:19 PM  Oxytocin given at the time of delivery: after delivery of baby  Delivering clinician: Trino Elise MD   Other personnel present at delivery:  Provider Role   Rosanne Tolbert RN Registered Nurse             Vaginal Counts       Initial count performed by 2 team members:  Two Team Members   MELINA Sands Dr.         [place] Suture Needles Sponges (RETIRED) Instruments   Initial counts 2  5    Added to count 0  0    Relief counts       Final counts 2  5            Placed during labor Accounted for at the  "end of labor   FSE Yes Yes   IUPC Yes Yes   Cervidil No                   Final count performed by 2 team members:  Two Team Members   MELINA Sands Dr.      Final count correct?: Yes             Apgars    Living status: Living   1 Minute 5 Minute 10 Minute 15 Minute 20 Minute   Skin color: 0  1  2      Heart rate: 2  2  2      Reflex irritability: 1  2  2      Muscle tone: 1  1  2      Respiratory effort: 1  2  2      Total: 5  8  10      Apgars assigned by: CLAUDIA TORREZ RN       Cord      Vessels: 3 Vessels    Cord Complications: Nuchal   Nuchal Intervention: delivered through         Nuchal cord description: tight nuchal cord         Cord Blood Disposition: Lab    Gases Sent?: No    Delayed cord clamping?: Yes    Cord Clamping Delay (seconds):  seconds            Stem cell collection?: No            Resuscitation    Methods: Suctioning, Ramos Puff, Oximetry        Care at Delivery: At 2 min of life infant does has poor respiratory effort retractions and nasal flaring, , brought to warmer for 30 seconds of PPV and 3-4 min of CPAP, suction x1.         Measurements      Weight: 7 lb 0.9 oz Length: 1' 8.5\"     Head circumference: 32.4 cm Chest circumference: 34.9 cm          Skin to Skin and Feeding Plan      Skin to skin initiation date/time: 1/10/1841    Skin to skin with: Mother  Skin to skin end date/time: 1/10/1841           Labor Events and Shoulder Dystocia    Fetal Tracing Prior to Delivery: Category 2  Shoulder dystocia present?: Neg                 Delivery (Maternal) (Provider to Complete) ([id number])    Episiotomy: None      Perineal lacerations: None    Repair suture: None       Blood Loss  Mother: Chely Dougherty #[id number]     Start of Mother's Information      Delivery Blood Loss  10/27/23 1306 - 10/27/23 2159      None                 End of Mother's Information  Mother: Chely Dougherty #[id number]                Delivery - Provider to Complete ([id number])    Delivering " clinician: Trino Elise MD  Delivery Type (Choose the 1 that will go to the Birth History): Vaginal, Spontaneous                         Other personnel:  Provider Role   Rosanne Tolbert, RN Registered Nurse                    Placenta    Date/Time: 10/27/2023  9:19 PM  Removal: Spontaneous  Disposition: Hospital disposal             Anesthesia    Method: Epidural  Cervical dilation at placement: 0-3                    Presentation and Position    Presentation: Vertex    Position: Right Occiput Anterior                     Trino Elise MD, MD

## 2023-10-29 VITALS
OXYGEN SATURATION: 97 % | TEMPERATURE: 98 F | SYSTOLIC BLOOD PRESSURE: 110 MMHG | DIASTOLIC BLOOD PRESSURE: 68 MMHG | RESPIRATION RATE: 18 BRPM | HEART RATE: 77 BPM

## 2023-10-29 PROBLEM — O13.9 GESTATIONAL HYPERTENSION: Status: ACTIVE | Noted: 2023-10-29

## 2023-10-29 PROCEDURE — 250N000013 HC RX MED GY IP 250 OP 250 PS 637: Performed by: FAMILY MEDICINE

## 2023-10-29 RX ORDER — IBUPROFEN 200 MG
400 TABLET ORAL EVERY 6 HOURS PRN
COMMUNITY
Start: 2023-10-29 | End: 2024-03-22

## 2023-10-29 RX ORDER — ACETAMINOPHEN 325 MG/1
650 TABLET ORAL EVERY 4 HOURS PRN
COMMUNITY
Start: 2023-10-29 | End: 2024-03-22

## 2023-10-29 RX ORDER — MODIFIED LANOLIN
OINTMENT (GRAM) TOPICAL
COMMUNITY
Start: 2023-10-29 | End: 2024-03-22

## 2023-10-29 RX ADMIN — PRENATAL VIT W/ FE FUMARATE-FA TAB 27-0.8 MG 1 TABLET: 27-0.8 TAB at 09:20

## 2023-10-29 RX ADMIN — DOCUSATE SODIUM 100 MG: 100 CAPSULE, LIQUID FILLED ORAL at 09:20

## 2023-10-29 ASSESSMENT — ACTIVITIES OF DAILY LIVING (ADL)
ADLS_ACUITY_SCORE: 18

## 2023-10-29 NOTE — PROGRESS NOTES
S: Discharge to home with infant     B: Patient had a vaginal delivery with 1st degree tear and no repair. Baby girl Lesa is bottle feeding pumped maternal breast milk and human donor milk (formula with maternal milk at home until milk comes in as needed)    A: VSS. Fundus firm, light flow. Pt reports very minimal pain and has declined any PRN pain medication. She is rarely using tucks or dermoplast. BP's and other VS have all been WNL. PPD score 0. Pt reports no longer wanting to latch the baby to breastfeed and does not want to use a nipple shield. Pt states she will pump and bottle feed going forward. Writer offered support, education, and encouragement on her feeding journey. Pt declines any further questions or concerns at this time.     R: Discharge instructions reviewed and questions answered.  Belongings gathered and returned to the patient. Agreed to follow up tomorrow 10/30/23 with Caroline Grijalva NP for infant well check or sooner with any questions or concerns.     Nursing Discharge Checklist:    Pneumovax screened and given, if appropriate: N/A  Influenza vaccine screened and given, if appropriate: N/A  Staples removed (): N/A  Breast milk returned: N/A  Hydrogel pads sent home:YES  Birth Certificate Done: YES  Public Health Referral Made: NO    Jacquelin Guerrero RN on 10/29/2023 at 10:28 AM

## 2023-10-29 NOTE — DISCHARGE SUMMARY
[facility] Discharge Summary    Chely Dougherty MRN# [id number]   Age: 28 year old YOB: 1995     Date of Admission:  10/26/2023  Date of Discharge::  10/29/2023 10:28 AM  Admitting Physician:  Christiano Conteh MD  Discharge Physician:  Trino Elise MD, MD     Home clinic: [facility]          Admission Diagnoses:   GDM, class A1 [O24.410]  Intrauterine pregnancy at 40 5/7 weeks gestation          Discharge Diagnosis:     Induced vaginal delivery secondary to pregnancy-induced hypertension and gestational diabetes  Intrauterine pregnancy at 40 6/7 weeks gestation  Pregnancy induced hypertension          Procedures:     Procedure(s): Fetal scalp electrode placement  Intrauterine pressure catheter placement       No other significant procedures performed during this admission           Medications Prior to Admission:     Medications Prior to Admission   Medication Sig Dispense Refill Last Dose    acetone urine (KETOSTIX) test strip Use 1 strip/day with first morning urine until ketones are negative for 7 days in a row, then use 1 strip/week. 50 strip 1     blood glucose (NO BRAND SPECIFIED) test strip Use to test blood sugar 4 times daily or as directed. To accompany: Blood Glucose Monitor Brands: per insurance. 200 strip 6     blood glucose monitoring (NO BRAND SPECIFIED) meter device kit Use to test blood sugar 4 times daily or as directed. Preferred blood glucose meter OR supplies to accompany: Blood Glucose Monitor Brands: per insurance. 1 kit 0     Prenatal Vit-Fe Fumarate-FA (PRENATAL MULTIVITAMIN W/IRON) 27-0.8 MG tablet Take 1 tablet by mouth daily       thin (NO BRAND SPECIFIED) lancets Use with lanceting device. To accompany: Blood Glucose Monitor Brands: per insurance. 100 each 6     [DISCONTINUED] Pediatric Multivitamins-Iron (FLINTSTONES PLUS IRON PO)                 Discharge Medications:     Current Discharge Medication List        START  taking these medications    Details   acetaminophen (TYLENOL) 325 MG tablet Take 2 tablets (650 mg) by mouth every 4 hours as needed for mild pain or fever (greater than or equal to 38  C /100.4  F (oral) or 38.5  C/ 101.4  F (core).)    Associated Diagnoses: NVD (normal vaginal delivery)      ibuprofen (ADVIL/MOTRIN) 200 MG tablet Take 2 tablets (400 mg) by mouth every 6 hours as needed for other (cramping)    Associated Diagnoses: NVD (normal vaginal delivery)      lanolin ointment Apply topically every hour as needed for other (sore nipples)    Associated Diagnoses: Breast feeding status of mother           CONTINUE these medications which have NOT CHANGED    Details   acetone urine (KETOSTIX) test strip Use 1 strip/day with first morning urine until ketones are negative for 7 days in a row, then use 1 strip/week.  Qty: 50 strip, Refills: 1    Associated Diagnoses: GDM (gestational diabetes mellitus), class A1      blood glucose (NO BRAND SPECIFIED) test strip Use to test blood sugar 4 times daily or as directed. To accompany: Blood Glucose Monitor Brands: per insurance.  Qty: 200 strip, Refills: 6    Associated Diagnoses: GDM (gestational diabetes mellitus), class A1      blood glucose monitoring (NO BRAND SPECIFIED) meter device kit Use to test blood sugar 4 times daily or as directed. Preferred blood glucose meter OR supplies to accompany: Blood Glucose Monitor Brands: per insurance.  Qty: 1 kit, Refills: 0    Associated Diagnoses: GDM (gestational diabetes mellitus), class A1      Prenatal Vit-Fe Fumarate-FA (PRENATAL MULTIVITAMIN W/IRON) 27-0.8 MG tablet Take 1 tablet by mouth daily      thin (NO BRAND SPECIFIED) lancets Use with lanceting device. To accompany: Blood Glucose Monitor Brands: per insurance.  Qty: 100 each, Refills: 6    Associated Diagnoses: GDM (gestational diabetes mellitus), class A1           STOP taking these medications       Pediatric Multivitamins-Iron (FLINTSTONES PLUS IRON PO)  Comments:   Reason for Stopping:                     Consultations:   No consultations were requested during this admission          Brief History of Labor:   For additional information, see history and physical and delivery summary.  Briefly, this is a  who was induced with Cytotec at 40-5/7 weeks gestation for gestational diabetes, diet controlled.  She had a good response to Cytotec, but then had slowing of her cervical dilation.  Pitocin augmentation was begun, but it was difficult to get adequate strength of her contractions due to fetal intolerance of higher levels with Pitocin.  She did gradually progressed to complete dilatation.  She successfully delivered a healthy female over an intact perineum with about 40 minutes of pushing.  There were no lacerations or other complications noted.           Hospital Course:   The patient's hospital course was unremarkable.  On discharge, her pain was well controlled. Vaginal bleeding is similar to peak menstrual flow.  Voiding without difficulty.  Ambulating well and tolerating a normal diet.  No fever.  Breastfeeding has been difficult.  Infant is not latching well.  Mother has been pumping and bottlefeeding.  Infant is stable.  No bowel movement yet.*  She was discharged on post-partum day #2.    Post-partum hemoglobin:   Hemoglobin   Date Value Ref Range Status   10/28/2023 11.3 (L) 11.7 - 15.7 g/dL Final   2011 12.4 11.7 - 15.7 g/dL Final             Discharge Instructions and Follow-Up:     Discharge diet: Regular   Discharge activity: Pelvic rest: abstain from intercourse and do not use tampons for 6 week(s)   Discharge follow-up: Follow up with primary care provider in 2, 6 weeks   Wound care: Drink plenty of fluids  Ice to area for comfort  Keep wound clean and dry           Discharge Disposition:     Discharged to home      Attestation:  I have reviewed today's vital signs, notes, medications, labs and imaging.    Trino Elise MD, MD

## 2023-10-29 NOTE — PROGRESS NOTES
Vitals WNL, /58 & 121/84. Fundus firm without massage, midline, -1 below U. Bleeding light, denies clots. Ambulates independently and frequently with no concerns. Pain in melody area, managed with ice packs, tucks, dermoplast and melody bottle. Bonding with infant going well. Voids spontaneously.  Tatiana Leyva RN on 10/29/2023 at 4:21 AM

## 2023-10-30 ENCOUNTER — PRENATAL OFFICE VISIT (OUTPATIENT)
Dept: FAMILY MEDICINE | Facility: CLINIC | Age: 28
End: 2023-10-30
Payer: MEDICAID

## 2023-10-30 VITALS — SYSTOLIC BLOOD PRESSURE: 124 MMHG | DIASTOLIC BLOOD PRESSURE: 84 MMHG

## 2023-10-30 PROCEDURE — 99213 OFFICE O/P EST LOW 20 MIN: CPT | Performed by: NURSE PRACTITIONER

## 2023-10-30 NOTE — ANESTHESIA POSTPROCEDURE EVALUATION
Patient: Chely Dougherty    Procedure: * No procedures listed *       Anesthesia Type:  Epidural    Note:  Disposition: Inpatient   Postop Pain Control: Uneventful            Sign Out: Well controlled pain   PONV: No   Neuro/Psych: Uneventful            Sign Out: Acceptable/Baseline neuro status   Airway/Respiratory: Uneventful            Sign Out: Acceptable/Baseline resp. status   CV/Hemodynamics: Uneventful            Sign Out: Acceptable CV status   Other NRE: NONE   DID A NON-ROUTINE EVENT OCCUR? No    Event details/Postop Comments:  Pt was happy with her anesthesia care.  No complications.  I advised the pt she may have some soreness at the epidural site and this is normal.  However if the soreness continues over a week or if redness is noted around the site to let anesthesia know.  I will follow up with the pt if needed.       Last vitals:  There were no vitals filed for this visit.    Electronically Signed By: MARTA Bello CRNA  October 29, 2023  7:27 PM

## 2023-10-30 NOTE — PROGRESS NOTES
"  Assessment & Plan   Problem List Items Addressed This Visit    None  Visit Diagnoses       Postpartum care following vaginal delivery    -  Primary           Patient induced due to GDM A1.  Had two elevated blood pressures without severe range without severe features.  Bp good control since discharged.  Discussed symptoms to monitor closely for.  Has postpartum scheduled.        28 minutes spent by me on the date of the encounter doing chart review, review of test results, interpretation of tests, patient visit, and documentation        BMI:   Estimated body mass index is 39.38 kg/m  as calculated from the following:    Height as of 10/24/23: 1.638 m (5' 4.5\").    Weight as of 10/24/23: 105.7 kg (233 lb).   Weight management plan: Discussed healthy diet and exercise guidelines        Caroline Grijalva NP  [facility] BASHIR Mo is a 28 year old, presenting for the following health issues:  Hypertension      HPI       HTN in pregnancy follow up    Had a few elevated bp prior to delivery.  No severe range bp or pre-eclampsia features.  Was discharged yesterday.  Good pain control after vaginal delivery without repair.  Denies headache, vision changes, RUQ pain.  Does have some mild peripheral edema.            Review of Systems   Constitutional, HEENT, cardiovascular, pulmonary, GI, , musculoskeletal, neuro, skin, endocrine and psych systems are negative, except as otherwise noted.      Objective    /84   LMP 12/23/2022 (Approximate)   Breastfeeding Yes   There is no height or weight on file to calculate BMI.  Physical Exam   GENERAL: healthy, alert and no distress  NECK: no adenopathy, no asymmetry, masses, or scars and thyroid normal to palpation  RESP: lungs clear to auscultation - no rales, rhonchi or wheezes  CV: regular rate and rhythm, normal S1 S2, no S3 or S4, no murmur, click or rub, no peripheral edema and peripheral pulses strong  ABDOMEN: soft, nontender, " no hepatosplenomegaly, no masses and bowel sounds normal  MS: no gross musculoskeletal defects noted, no edema

## 2023-11-04 NOTE — PROGRESS NOTES
Chely Dougherty  Gender: female  : 1995  [address]  [facility] [id number]  [phone] (home)   Medical Record: [id number]  Primary Care Provider: Caroline Grijalva       [facility]   ?   Discharge Phone Call: Key Words/Key Times     How are you and the baby? Doing well    How are feedings going? Feeding well, back at birth weight    Voiding & Stooling? yes    Any questions or concerns? Green goop on eyes, wipes away with warm washcloth. Advised to consult MD if gets worse or does not clear up in 3-4 days, or if infant has redness in eyes.     Follow-up appointment? 23      We want to provide excellent care here at [facility]. Do you have any feedback for us that would help us improve? None    Call back COMMENTS:   Everybody there is helpful and nice.   Tatiana Leyva RN on 2023 at 1:02 PM

## 2023-11-13 ENCOUNTER — MYC MEDICAL ADVICE (OUTPATIENT)
Dept: FAMILY MEDICINE | Facility: CLINIC | Age: 28
End: 2023-11-13

## 2023-11-13 ENCOUNTER — MYC MEDICAL ADVICE (OUTPATIENT)
Dept: FAMILY MEDICINE | Facility: CLINIC | Age: 28
End: 2023-11-13
Payer: MEDICAID

## 2023-11-13 ENCOUNTER — E-VISIT (OUTPATIENT)
Dept: FAMILY MEDICINE | Facility: CLINIC | Age: 28
End: 2023-11-13
Payer: MEDICAID

## 2023-11-13 DIAGNOSIS — N61.0 MASTITIS: Primary | ICD-10-CM

## 2023-11-13 PROCEDURE — 99207 PR NO CHARGE LOS: CPT | Performed by: NURSE PRACTITIONER

## 2023-11-13 RX ORDER — DICLOXACILLIN SODIUM 500 MG
500 CAPSULE ORAL 4 TIMES DAILY
Qty: 40 CAPSULE | Refills: 0 | Status: SHIPPED | OUTPATIENT
Start: 2023-11-13 | End: 2023-11-23

## 2023-11-13 NOTE — PATIENT INSTRUCTIONS
Thank you for choosing us for your care. I have placed an order for a prescription so that you can start treatment. View your full visit summary for details by clicking on the link below. Your pharmacist will able to address any questions you may have about the medication.     If you're not feeling better within 5-7 days, please schedule an appointment.  You can schedule an appointment right here in [facility], or call [phone]  If the visit is for the same symptoms as your eVisit, we'll refund the cost of your eVisit if seen within seven days.

## 2023-11-13 NOTE — TELEPHONE ENCOUNTER
Patient states she has has this happen 3 times.    The first time she pumped every 30 min and this helped.  This is not helping this time and it is painful to pump.  She has tried massaging warms breast feeding devices.    She has not been seen for this before.  No redness.  No fever.    Her right breast has been painful since last night.  She also has body aches.    Saskia Mackey RN on 11/13/2023 at 1:11 PM

## 2023-11-13 NOTE — TELEPHONE ENCOUNTER
Message handled by Nurse Triage with [facility] - provider name: Caroline Grijalva NP .    Patient should do an evisit.    Patient notified.  Saskia Mackey RN on 11/13/2023 at 1:30 PM

## 2023-11-27 ENCOUNTER — MEDICAL CORRESPONDENCE (OUTPATIENT)
Dept: HEALTH INFORMATION MANAGEMENT | Facility: CLINIC | Age: 28
End: 2023-11-27
Payer: MEDICAID

## 2023-12-05 ENCOUNTER — MYC MEDICAL ADVICE (OUTPATIENT)
Dept: FAMILY MEDICINE | Facility: CLINIC | Age: 28
End: 2023-12-05
Payer: COMMERCIAL

## 2024-01-08 ENCOUNTER — MEDICAL CORRESPONDENCE (OUTPATIENT)
Dept: HEALTH INFORMATION MANAGEMENT | Facility: CLINIC | Age: 29
End: 2024-01-08

## 2024-01-08 ENCOUNTER — MYC MEDICAL ADVICE (OUTPATIENT)
Dept: FAMILY MEDICINE | Facility: CLINIC | Age: 29
End: 2024-01-08

## 2024-01-08 ENCOUNTER — PRENATAL OFFICE VISIT (OUTPATIENT)
Dept: FAMILY MEDICINE | Facility: CLINIC | Age: 29
End: 2024-01-08
Payer: COMMERCIAL

## 2024-01-08 VITALS
OXYGEN SATURATION: 97 % | DIASTOLIC BLOOD PRESSURE: 70 MMHG | TEMPERATURE: 97.5 F | RESPIRATION RATE: 18 BRPM | HEIGHT: 63 IN | SYSTOLIC BLOOD PRESSURE: 102 MMHG | HEART RATE: 76 BPM | WEIGHT: 218 LBS | BODY MASS INDEX: 38.62 KG/M2

## 2024-01-08 DIAGNOSIS — O13.3 GESTATIONAL HYPERTENSION, THIRD TRIMESTER: ICD-10-CM

## 2024-01-08 DIAGNOSIS — O24.410 GDM, CLASS A1: ICD-10-CM

## 2024-01-08 PROCEDURE — 99207 PR POST PARTUM EXAM: CPT | Performed by: NURSE PRACTITIONER

## 2024-01-08 ASSESSMENT — PAIN SCALES - GENERAL: PAINLEVEL: NO PAIN (0)

## 2024-01-08 NOTE — PROGRESS NOTES
Chely is here for a 6-week postpartum checkup.    She had a  of a viable girl, weight 7 pounds 1 oz., with none complications. Date of delivery was 10/27/2023. Since delivery, she has not been breast feeding.  She has No signs of infection, bleeding or other complications.  She is not pregnant.  We discussed contraceptions and she has chosen unsure but would like to discuss with provider.      Post partum tubal: Unknown  History of Gestational Diabetes? Yes  Type of Delivery:  Vaginal  Feeding Method:  Formula and pumping  If initiated breast feeding and stopped, how long did you breast feed?:  None    REVIEW OF SYSTEMS:  Ears/Nose/Throat: negative  Respiratory: negative  Cardiovascular: negative  Gastrointestinal: negative  Genitourinary: negative  Musculoskeletal: negative    Neurologic: negative   Skin: negative   Endocrine:  negative  Vagina: negative  Cervix: negative  Breasts: negative  Vulva: negative  Episiotomy: negative    Contraception Plan: Mirena IUD and condoms    Medical History Reviewed yes - no change  Family History Reviewed yes - no change  Problem List Updated yes - updated    EXAM:  LMP 2022 (Approximate)   HEENT: grossly normal.  NECK: no lymphadenopathy or thyroidomegaly.  LUNGS: CTA X 2, no rales or crackles.  BACK: No spinal or CVA tenderness.  HEART: RRR without murmurs clicks or gallops.  ABDOMEN: soft, non tender, good bowel sounds, without masses rebound, guarding or tenderness.      EXTREMITIES:  warm to touch, good pulses, no ankle edema or calf tenderness.  NEUROLOGIC: grossly normal.    ASSESSMENT:   6-week postpartum exam after .    PLAN:    Contraception methods discussed  Exercise encouraged  Flu shot recommended  Hemoglobin obtained  Will schedule IUD/ PAP/ one hour glucose.  One-hour glucose tolerance test needed? Yes    Caroline Grijalva, CNP

## 2024-01-10 NOTE — TELEPHONE ENCOUNTER
Caroline Grijalva NP Trisko, Margaret C  Phone Number: [phone]     In March for iud please reschedule

## 2024-03-14 ENCOUNTER — MYC MEDICAL ADVICE (OUTPATIENT)
Dept: FAMILY MEDICINE | Facility: CLINIC | Age: 29
End: 2024-03-14
Payer: COMMERCIAL

## 2024-03-21 ENCOUNTER — OFFICE VISIT (OUTPATIENT)
Dept: FAMILY MEDICINE | Facility: CLINIC | Age: 29
End: 2024-03-21
Payer: COMMERCIAL

## 2024-03-21 VITALS
HEART RATE: 111 BPM | TEMPERATURE: 98 F | RESPIRATION RATE: 20 BRPM | BODY MASS INDEX: 38.2 KG/M2 | DIASTOLIC BLOOD PRESSURE: 82 MMHG | SYSTOLIC BLOOD PRESSURE: 118 MMHG | HEIGHT: 63 IN | OXYGEN SATURATION: 100 % | WEIGHT: 215.6 LBS

## 2024-03-21 DIAGNOSIS — Z30.430 ENCOUNTER FOR INSERTION OF INTRAUTERINE CONTRACEPTIVE DEVICE: Primary | ICD-10-CM

## 2024-03-21 DIAGNOSIS — Z12.4 CERVICAL CANCER SCREENING: ICD-10-CM

## 2024-03-21 PROBLEM — O13.9 GESTATIONAL HYPERTENSION: Status: RESOLVED | Noted: 2023-10-29 | Resolved: 2024-03-21

## 2024-03-21 PROBLEM — O09.90 PREGNANCY, SUPERVISION, HIGH-RISK: Status: RESOLVED | Noted: 2023-08-15 | Resolved: 2024-03-21

## 2024-03-21 PROBLEM — O24.410 GDM, CLASS A1: Status: RESOLVED | Noted: 2023-08-15 | Resolved: 2024-03-21

## 2024-03-21 PROBLEM — O09.93 SUPERVISION OF HIGH RISK PREGNANCY IN THIRD TRIMESTER: Status: RESOLVED | Noted: 2023-10-26 | Resolved: 2024-03-21

## 2024-03-21 LAB
GLUCOSE 1H P 50 G GLC PO SERPL-MCNC: 130 MG/DL (ref 70–129)
HCG UR QL: NEGATIVE
HGB BLD-MCNC: 14.1 G/DL (ref 11.7–15.7)

## 2024-03-21 PROCEDURE — 87624 HPV HI-RISK TYP POOLED RSLT: CPT | Performed by: FAMILY MEDICINE

## 2024-03-21 PROCEDURE — 82950 GLUCOSE TEST: CPT | Performed by: FAMILY MEDICINE

## 2024-03-21 PROCEDURE — 83036 HEMOGLOBIN GLYCOSYLATED A1C: CPT | Performed by: NURSE PRACTITIONER

## 2024-03-21 PROCEDURE — G0145 SCR C/V CYTO,THINLAYER,RESCR: HCPCS | Performed by: FAMILY MEDICINE

## 2024-03-21 PROCEDURE — 36415 COLL VENOUS BLD VENIPUNCTURE: CPT | Performed by: FAMILY MEDICINE

## 2024-03-21 PROCEDURE — 58300 INSERT INTRAUTERINE DEVICE: CPT | Performed by: FAMILY MEDICINE

## 2024-03-21 PROCEDURE — 81025 URINE PREGNANCY TEST: CPT | Performed by: FAMILY MEDICINE

## 2024-03-21 ASSESSMENT — PAIN SCALES - GENERAL: PAINLEVEL: NO PAIN (0)

## 2024-03-21 NOTE — PROGRESS NOTES
"  Assessment & Plan     (Z30.430) Encounter for IUD insertion  (primary encounter diagnosis)  Comment: Mirena IUD placed without difficulty  Plan: HCG Qual, Urine ([id number])        Pregnancy test was negative    (Z12.4) Cervical cancer screening  Comment: Due for Pap smear.  Plan: Pap Screen with HPV - recommended age 30 - 65         years        Pap smear obtained prior to IUD placement.    Review of prior external note(s) from - her pregnancy and delivery  30 minutes spent by me on the date of the encounter doing chart review, history and exam, documentation and further activities per the note        MEDICATIONS:   No orders of the defined types were placed in this encounter.        - Continue other medications without change    Subjective   Chely is a 28 year old, presenting for the following health issues:  IUD        3/21/2024     2:45 PM   Additional Questions   Roomed by Nereyda MADRID   Patient here for IUD placement.  She is also due for Pap smear so we will do that for her today also.  She and her partner have not been having regular sex due to the fact that she does not want to get pregnant.  She has had 2-3 menses and is still lactating so that probably accounts for some of the irregularity.  She is pumping but they are supplementing with formula since she is not producing enough.  She wants reliable birth control until they decide whether or not they want to have another baby.  The Mirena IUD is extremely reliable we talked about placement today the potential for cramping and the need for ibuprofen if any of that should happen.      Review of Systems  Constitutional, neuro, ENT, endocrine, pulmonary, cardiac, gastrointestinal, genitourinary, musculoskeletal, integument and psychiatric systems are negative, except as otherwise noted.      Objective    /82   Pulse 111   Temp 98  F (36.7  C) (Temporal)   Resp 20   Ht 1.594 m (5' 2.75\")   Wt 97.8 kg (215 lb 9.6 oz)   LMP 02/26/2024   SpO2 " 100%   BMI 38.50 kg/m    Body mass index is 38.5 kg/m .  Physical Exam   GENERAL: alert and no distress   (female): Normal external genitalia.  Bimanual exam shows is slightly anterior cervix shows retroflexed uterus.  Could not tell size due to the patient's body habitus.  Speculum was placed and the cervix was quite anterior, we obtained a Pap smear without difficulty.  The cervix was then prepped with Betadine solution x 3 and then a single-tooth tenaculum was placed at the 2 and 10 o'clock position over a 30 to 60 seconds timeframe.  An endometrial Pipelle was used to measure the depth of the uterus which was 9 cm.  The Mirena IUD was loaded into the applicator and the plunger set to 9 cm.  The IUD was placed without difficulty and upon releasing it into the uterus she had minimal cramping but nothing significant.  The applicator was removed and the strings were trimmed to 2 inches.  The tenaculum was removed and no bleeding noted.  She was showing the IUD strings that she could feel for them at her own convenience.  She was warned not to use tampons for the first couple cycles and then generally she should not need tampons because of the minimize the amount of bleeding that she should get with the Mirena IUD in place.    Results for orders placed or performed in visit on 03/21/24 (from the past 24 hour(s))   HCG Qual, Urine ([id number])   Result Value Ref Range    hCG Urine Qualitative Negative Negative   OB HEMOGLOBIN   Result Value Ref Range    Hemoglobin 14.1 11.7 - 15.7 g/dL   Glucose tolerance, gest screen, 1 hour   Result Value Ref Range    Glu Gest Screen 1hr 50g 130 (H) 70 - 129 mg/dL    Narrative    This is a screening test for Gestational Diabetes Mellitus.   If results are 130 mg/dL or greater, a Standard 100 gram Gestational  3 hour Glucose Tolerance should be performed.     Electronically signed by:  Christiano Conteh M.D.  3/21/2024

## 2024-03-22 DIAGNOSIS — O24.410 GDM, CLASS A1: Primary | ICD-10-CM

## 2024-03-22 LAB — HBA1C MFR BLD: 5.3 %

## 2024-03-28 LAB
HUMAN PAPILLOMA VIRUS 16 DNA: NEGATIVE
HUMAN PAPILLOMA VIRUS 18 DNA: NEGATIVE
HUMAN PAPILLOMA VIRUS FINAL DIAGNOSIS: NORMAL
HUMAN PAPILLOMA VIRUS OTHER HR: NEGATIVE

## 2024-04-29 ENCOUNTER — MEDICAL CORRESPONDENCE (OUTPATIENT)
Dept: HEALTH INFORMATION MANAGEMENT | Facility: CLINIC | Age: 29
End: 2024-04-29
Payer: COMMERCIAL

## 2024-05-22 ENCOUNTER — MYC MEDICAL ADVICE (OUTPATIENT)
Dept: FAMILY MEDICINE | Facility: CLINIC | Age: 29
End: 2024-05-22
Payer: COMMERCIAL

## 2024-06-05 ENCOUNTER — NURSE TRIAGE (OUTPATIENT)
Dept: FAMILY MEDICINE | Facility: CLINIC | Age: 29
End: 2024-06-05
Payer: COMMERCIAL

## 2024-06-05 ENCOUNTER — MYC MEDICAL ADVICE (OUTPATIENT)
Dept: FAMILY MEDICINE | Facility: CLINIC | Age: 29
End: 2024-06-05
Payer: COMMERCIAL

## 2024-06-05 NOTE — TELEPHONE ENCOUNTER
Patient sent in a mychart:    I ve been spotty ever since I got my birth control put in. I have to wear pads all the time jose it s not enough for a tampon. I feel gross and smelly all the time because of it. I was told i should spit around my  period  times. Not all the time every day. Some days are better than others.   Patient had IUD put in on 3/21/24.    RN triage:    She has had bleeding almost every day except for 1 week.    She states it is just spotting that is discolored like a brown color.  She states this happens when she coughs also.    She has the spotting all day.  She is wearing thin pads all the time.    She states she is not bleeding where she fills a period pad.      She states this brownish spotting has been happening the whole time.    Saskia Mackey RN on 6/5/2024 at 4:51 PM

## 2024-06-06 NOTE — TELEPHONE ENCOUNTER
RN placed call to patient and advised of the below from provider. Patient verbalized understanding and all questions answered.     MARTHA Patel, RN  [facility] Registered Nurse  Clinic Triage  June 6, 2024

## 2024-06-06 NOTE — TELEPHONE ENCOUNTER
Patients can spot or have irregular bleeding for up to six months after getting an IUD.  She could try taking two aleve twice a day with food for 5 days to see if this gets it to stop.  If still bothersome after this we sometimes do oral birth control to get it to stop.  Caroline Grijalva, CNP

## 2024-07-06 ENCOUNTER — HEALTH MAINTENANCE LETTER (OUTPATIENT)
Age: 29
End: 2024-07-06

## 2025-07-13 ENCOUNTER — MYC MEDICAL ADVICE (OUTPATIENT)
Dept: FAMILY MEDICINE | Facility: CLINIC | Age: 30
End: 2025-07-13
Payer: COMMERCIAL

## 2025-07-13 ENCOUNTER — HEALTH MAINTENANCE LETTER (OUTPATIENT)
Age: 30
End: 2025-07-13

## 2025-08-11 ENCOUNTER — OFFICE VISIT (OUTPATIENT)
Dept: FAMILY MEDICINE | Facility: CLINIC | Age: 30
End: 2025-08-11
Payer: COMMERCIAL

## 2025-08-11 VITALS
WEIGHT: 196 LBS | HEART RATE: 80 BPM | BODY MASS INDEX: 34.73 KG/M2 | OXYGEN SATURATION: 98 % | TEMPERATURE: 97.8 F | RESPIRATION RATE: 16 BRPM | HEIGHT: 63 IN | SYSTOLIC BLOOD PRESSURE: 120 MMHG | DIASTOLIC BLOOD PRESSURE: 76 MMHG

## 2025-08-11 DIAGNOSIS — Z30.432 ENCOUNTER FOR IUD REMOVAL: Primary | ICD-10-CM

## 2025-08-11 PROCEDURE — 58301 REMOVE INTRAUTERINE DEVICE: CPT | Performed by: NURSE PRACTITIONER

## 2025-08-11 ASSESSMENT — PAIN SCALES - GENERAL: PAINLEVEL_OUTOF10: NO PAIN (0)
